# Patient Record
Sex: MALE | Race: WHITE
[De-identification: names, ages, dates, MRNs, and addresses within clinical notes are randomized per-mention and may not be internally consistent; named-entity substitution may affect disease eponyms.]

---

## 2017-01-02 NOTE — DX
PA and Lateral Chest 

 

Clinical Indications:  Chest tightness x2 days, COPD

 

Comparison: August 10, 2015, March 11, 2015, May 17, 2014

 

Findings: There is patchy density in the right upper lobe that may represent underlying pneumonia. Th
ere is no pleural fluid. There is chronic or recurrent bronchial wall thickening associated with mild
ly prominent lung volumes. Size and pulmonary vascularity are stable and normal. Paraspinal stripe co
nvexity in the low thoracic region is stable since 2014.

 

Impression:  Airways disease. Subtle right upper lobe infiltrate. If the patient is to receive antibi
otic therapy, then recommend followup chest x-ray to insure clearing. If antibiotics are not indicate
d, then consider noncontrast chest CT for further evaluation.

## 2017-01-02 NOTE — EDPHY
H & P


Stated Complaint: Increased pain to fracture, numb hand, coughing blood, lower 

back pain.


Time Seen by Provider: 01/02/17 09:14


HPI/ROS: 


CHIEF COMPLAINT:  Right hand swelling and numbness, left mid back pain





HISTORY OF PRESENT ILLNESS:  This is a 56-year-old male with history of alcohol 

abuse who reportedly fell 2 days ago (December 31st) while intoxicated.  He was 

evaluated at that time and found to have a right proximal humerus fracture.  

His evaluation included a CT scan of his head and neck, right shoulder and 

right humerus x-rays.  He was placed in a sling and referred to Orthopedic 

surgery for follow-up.  He was discharged to the [facility].





He returns today complaining of right hand swelling and numbness.  He is 

wearing a sling with his hand in a dependent position.  He also reports left 

posterior lateral back/rib pain. He tells me that he has not had any alcohol 

since this accident occurred.  He reports being sober for several months this 

past summer and fall but fell off the [facility] last month.  He does not plan to 

drink again.  He has also quit smoking.





He states that he has a chronic cough that is actually somewhat improved 

slightly.  However he did have a recent nose bleed and reports coughing up some 

blood.  He has some chest pain that he believes is related to his ribcage pain. 

He does not feel short of breath.





He was intoxicated at the homeless shelter when this fall occurred and he is 

not welcome back there as a result.  He has been sleeping on the floor in 

various places for the last 2 nights.  He has an apartment that is being 

provided by a [employer] and will be available on January 10th, 1 

week from now.





He is asking about a respite bed.





REVIEW OF SYSTEMS:





A ten point review of systems was performed and is negative with the exception 

of the items mentioned in the HPI.





Source: Patient


Exam Limitations: No limitations





- Personal History


Tetanus Vaccine Date: 2008





- Medical/Surgical History


Hx Asthma: No


Hx Chronic Respiratory Disease: Yes


Hx Diabetes: No


Hx Cardiac Disease: No


Hx Renal Disease: No


Hx Cirrhosis: No


Hx Alcoholism: No


Hx HIV/AIDS: No


Hx Splenectomy or Spleen Trauma: No


Other PMH: COPD.  Alcohol abuse.  Tobacco abuse.  Hepatitis-C.  Episode of 

pneumonia





- Social History


Smoking Status: Light smoker


Alcohol Use: Sober (He states that he has been sober birth the past 2 days.)


Additional Social History: 


He is homeless.





- Physical Exam


Exam: 


General Appearance:  Alert.  Vital signs reviewed.  Blood pressure 152/96.


Eyes:  Pupils equal and round, mild bilateral conjunctival injection, no 

discharge. Anicteric.


ENT, Mouth:  Mucous membranes are moist, no oropharyngeal erythema or edema.


Neck:  Nontender to palpation over the cervical spine in the posterior midline.

  Trachea midline.


Respiratory:  Lungs are clear to auscultation but breath sounds are somewhat 

distant; no wheezes, rales, or rhonchi.


Cardiovascular:  Regular rate and rhythm; no murmur, rub, or gallop. Not 

tachycardic at the time of my exam.


Gastrointestinal:  Abdomen is obese, soft and nontender, no masses or 

organomegaly, bowel sounds normal.


Skin:  Warm and dry, no rashes on exposed skin, normal color.


Back:  Nontender to palpation over the thoracolumbar spine.


Thorax:  Point tenderness over the lower ribs on the left posterior laterally, 

no crepitus.


Extremities:  His right arm is in a sling with the right hand dependent and 

swollen.  He is able to flex and extend his fingers.


Pulses:  2+ radial pulses bilaterally.


Neurological:  Alert and oriented.  Moving about easily.


Psychiatric:  Normal affect.


Constitutional: 


 Initial Vital Signs











Temperature (C)  36.6 C   01/02/17 08:29


 


Heart Rate  103 H  01/02/17 08:29


 


Respiratory Rate  20   01/02/17 08:29


 


Blood Pressure  152/96 H  01/02/17 08:29


 


O2 Sat (%)  97   01/02/17 08:29








 











O2 Delivery Mode               Room Air














Allergies/Adverse Reactions: 


 





No Known Allergies Allergy (Verified 12/31/16 19:32)


 








Home Medications: 














 Medication  Instructions  Recorded


 


Albuterol  05/17/14


 


Ibuprofen [Motrin (*)] 800 mg PO Q6 #15 tab 09/14/15


 


Hydrocodone/APAP 5/325 [Norco 1 each PO Q4-6PRN PRN #11 tab 12/31/16





5/325 (*)]  














Medical Decision Making


ED Course/Re-evaluation: 


Chest x-ray ordered to rule out rib injury or pneumothorax.  He does have some 

rib tenderness in the left lower ribcage posterior laterally.  Given his recent 

trauma he certainly could have a rib injury that would be causing this pain and 

also be causing some difficulty with breathing.  He is not hypoxic.  He is not 

febrile.





No rib injury pneumothorax seen.





Think that is hand swelling and sensation of numbness is due to the fact that 

his hand has been in a dependent position in the sling.  A shoulder immobilizer 

was tried but did not improve the position of his hand.  He was placed back in 

the sling and the sling was adjusted.  He is not having worsening humerus pain 

and I have not reimaged his fracture as a result.  He has normal sensation over 

his injured arm and hand.  He has 5/5  strength in the right hand.  Right 

radial pulses 2+.  I do not suspect neurovascular injury.





I do not find evidence of other injuries related to this recent trauma.





 


Case management was consulted concerning disposition.





Departure





- Departure


Disposition: Home, Routine, Self-Care


Clinical Impression: 


Arm fracture, right


Qualifiers:


 Encounter type: subsequent encounter Fracture healing: with routine healing 

Qualifier Code: (S42.301D) Unspecified fracture of shaft of humerus, right arm, 

subsequent encounter for fracture with routine healing


Condition: Good


Instructions:  Arm Fracture in Adults (ED)


Additional Instructions: 


You must wear the sling until you are seen by a specialist.  You were referred 

to Dr. Payan, orthopedic surgeon.  Please call his office tomorrow and 

arrange a follow-up appointment.  Tell the office staff that you have a 

fracture of your upper arm and have been placed in a sling.  If you have 

difficulty making this appointment you can call the emergency department and 

ask to speak with the .





When you are wearing the sling you should have your right hand elevated, it 

should not hanging down.  When your right hand is hanging down it will become 

swollen and might get numb and hurt.





I know you talked about going back to the homeless shelter with our case 

manager and were not interested in that process.  As you know, you can go to 

any of the warming shelters.


Referrals: 


[facility] [Outside] - As per Instructions


Franco Payan MD [Medical Doctor] - As per Instructions

## 2017-03-02 ENCOUNTER — HOSPITAL ENCOUNTER (OUTPATIENT)
Dept: HOSPITAL 80 - FIMAGING | Age: 57
End: 2017-03-02
Payer: MEDICAID

## 2017-03-02 DIAGNOSIS — S42.211D: Primary | ICD-10-CM

## 2017-04-11 ENCOUNTER — HOSPITAL ENCOUNTER (EMERGENCY)
Dept: HOSPITAL 80 - FED | Age: 57
Discharge: HOME | End: 2017-04-11
Payer: MEDICAID

## 2017-04-11 VITALS — RESPIRATION RATE: 18 BRPM | OXYGEN SATURATION: 95 %

## 2017-04-11 VITALS — SYSTOLIC BLOOD PRESSURE: 165 MMHG | HEART RATE: 82 BPM | TEMPERATURE: 97.9 F | DIASTOLIC BLOOD PRESSURE: 97 MMHG

## 2017-04-11 DIAGNOSIS — F17.200: ICD-10-CM

## 2017-04-11 DIAGNOSIS — J18.9: ICD-10-CM

## 2017-04-11 DIAGNOSIS — R10.84: Primary | ICD-10-CM

## 2017-04-11 LAB
% IMMATURE GRANULYOCYTES: 0.8 % (ref 0–1.1)
ABSOLUTE IMMATURE GRANULOCYTES: 0.09 10^3/UL (ref 0–0.1)
ABSOLUTE NRBC COUNT: 0 10^3/UL (ref 0–0.01)
ADD DIFF?: NO
ADD MORPH?: NO
ADD SCAN?: NO
ALBUMIN SERPL-MCNC: 4.9 G/DL (ref 3.5–5)
ALP SERPL-CCNC: 100 IU/L (ref 38–126)
ALT SERPL-CCNC: 82 IU/L (ref 21–72)
ANION GAP SERPL CALC-SCNC: 15 MEQ/L (ref 8–16)
AST SERPL-CCNC: 65 IU/L (ref 17–59)
ATYPICAL LYMPHOCYTE FLAG: 10 (ref 0–99)
BILIRUB SERPL-MCNC: 1.5 MG/DL (ref 0.1–1.4)
BILIRUBIN-CONJUGATED: 0.6 MG/DL (ref 0–0.5)
BILIRUBIN-UNCONJUGATED: 0.9 MG/DL (ref 0–1.1)
CALCIUM SERPL-MCNC: 9.8 MG/DL (ref 8.5–10.4)
CHLORIDE SERPL-SCNC: 104 MEQ/L (ref 97–110)
CO2 SERPL-SCNC: 22 MEQ/L (ref 22–31)
CREAT SERPL-MCNC: 0.9 MG/DL (ref 0.7–1.3)
ERYTHROCYTE [DISTWIDTH] IN BLOOD BY AUTOMATED COUNT: 12.3 % (ref 11.5–15.2)
FRAGMENT RBC FLAG: 0 (ref 0–99)
GFR SERPL CREATININE-BSD FRML MDRD: > 60 ML/MIN/{1.73_M2}
GLUCOSE SERPL-MCNC: 133 MG/DL (ref 70–100)
HCT VFR BLD CALC: 45.4 % (ref 40–51)
HGB BLD-MCNC: 15.5 G/DL (ref 13.7–17.5)
LEFT SHIFT FLG: 10 (ref 0–99)
LIPEMIA HEMOLYSIS FLAG: 90 (ref 0–99)
MCH RBC BLDCO QN: 31.9 PG (ref 27.9–34.1)
MCHC RBC AUTO-ENTMCNC: 34.1 G/DL (ref 32.4–36.7)
MCV RBC AUTO: 93.4 FL (ref 81.5–99.8)
NRBC-AUTO%: 0 % (ref 0–0.2)
PLATELET # BLD: 212 10^3/UL (ref 150–400)
PLATELET CLUMPS FLAG: 10 (ref 0–99)
PMV BLD AUTO: 11.5 FL (ref 8.7–11.7)
POTASSIUM SERPL-SCNC: 4.1 MEQ/L (ref 3.5–5.2)
PROT SERPL-MCNC: 8.5 G/DL (ref 6.3–8.2)
RBC # BLD AUTO: 4.86 10^6/UL (ref 4.4–6.38)
SODIUM SERPL-SCNC: 141 MEQ/L (ref 134–144)

## 2017-04-11 NOTE — CPEKG
Heart Rate: 97

RR Interval: 619

P-R Interval: 140

QRSD Interval: 92

QT Interval: 360

QTC Interval: 458

P Axis: 59

QRS Axis: 4

T Wave Axis: 39

EKG Severity - NORMAL ECG -

EKG Impression: SINUS RHYTHM

Electronically Signed By: Judith Conti 11-Apr-2017 13:41:51

## 2017-04-11 NOTE — EDPHY
H & P


Time Seen by Provider: 04/11/17 09:52


HPI/ROS: 





CHIEF COMPLAINT: Abdominal pain. 





HISTORY OF PRESENT ILLNESS: The patient is a 57-year-old male with a history of 

COPD and alcoholism who presents with 2 days of generalized abdominal pain.  

The pain waxes and wanes and is rated 8/10.  Associated with nausea last 

evening.  He developed persistent shortness of breath and chest pressure last 

night, partially relieved albuterol inhalers.  He also has an ongoing dry 

cough.  He reports that this feels similar to when he previously had food 

poisoning. He has not eaten in 2 days but is able to keep small amounts of 

water down. He denies fever, vomiting, diarrhea, or other complaints. 





REVIEW OF SYSTEMS:


A complete 10-point review of systems was performed and is negative except for 

those items mentioned in the HPI.








Past Medical/Surgical History: 





Hepatitis C, pneumonia, COPD, osteoarthritis, peptic ulcer disease. 


Social History: 





Alcohol abuse, smoker. 


Smoking Status: Light smoker


Physical Exam: 





General Appearance: Alert, appears in pain 


Eyes: Pupils equal and round, no conjunctival pallor or injection


ENT, Mouth: Mucous membranes moist


Neck: Normal inspection


Respiratory: Scattered expiratory wheezes. 


Cardiovascular: Regular rate and rhythm 


Gastrointestinal:  Abdomen is distended, diffusely tender with rebound, bowel 

sounds normal. 


Neurological:  A&O, nonfocal exam


Skin:  Warm and dry, no rash


Extremities:  Nontender, no pedal edema


Psychiatric: Mood and affect normal





Constitutional: 


 Initial Vital Signs











Temperature (C)  36.7 C   04/11/17 09:40


 


Heart Rate  98   04/11/17 09:40


 


Respiratory Rate  18   04/11/17 09:40


 


Blood Pressure  163/124 H  04/11/17 09:40


 


O2 Sat (%)  96   04/11/17 09:40








 











O2 Delivery Mode               Room Air














Allergies/Adverse Reactions: 


 





No Known Allergies Allergy (Verified 04/11/17 09:38)


 








Home Medications: 














 Medication  Instructions  Recorded


 


Albuterol  05/17/14


 


Azithromycin [Zithromax] 250 mg PO DAILY #6 tab 04/11/17


 


Hydrocodone/APAP 5/325 [Norco 1 - 2 tab PO Q4H PRN #10 tab 04/11/17





5/325]  


 


Ondansetron Odt [Zofran Odt 4 mg 4 mg PO Q4 PRN #6 tab 04/11/17





(*)]  


 


Pantoprazole Sodium [Protonix 40mg 40 mg PO DAILY #20 tab 04/11/17





(*)]  














Medical Decision Making





- Diagnostics


EKG Interpretation: 





EKG interpreted by me reveals normal sinus rhythm, rate 97, no ST or T segment 

changes.





Imaging: 


CT scan of the abdomen and pelvis read by the radiologist is unremarkable.


Chest x-ray independently reviewed by me pneumonitis versus atypical pneumonia.





ED Course/Re-evaluation: 





57-year-old male with a history of COPD and past alcoholism presents with 8/10 

generalized abdominal pain and peritoneal signs.  Will obtain a stat abdominal 

CT. An IV was established and labs ordered. 4mg IV Morphine and 4mg IV Zofran 

administered for pain and nausea. Chest pain/SOB c/w bronchospasm; doubt ACS.  

DuoNeb treatment administered. 





WBC 11.17. Lipase is normal at 123. AST/ALT slightly elevated. 





1129: Reassessed patient. Discussed results of CT scan. He is feeling better 

but is still has abd pain. 15mg IV Toradol administered. Chest x-ray ordered. 





I independently reviewed the patient's chest x-ray on the PACS system. Please 

see Imaging section for radiologist report. 





1234: Reassessed patient. He is feeling better after DuoNeb. The chest 

tightness and cough have resolved. Lungs are CTA.  He is feeling much better. 

The abdominal pain has lessened and he is tender only in the epigastrium. He 

tells me now that he has a history of peptic ulcer disease and this feels 

similar. I will prescribe Protonix for this abdominal pain and Azithromycin for 

pneumonitis seen on chest x-ray. He is comfortable with this plan.  I feel he 

is safe for discharge at this time. He understands to call his doctor today to 

set up an appointment for tomorrow.   Abdominal exam is benign on discharge; 

localized epigastric tenderness without peritoneal signs. Abdominal pain 

precautions given.





Differential Diagnosis: 





The differential diagnosis for the patient's abdominal pain included but was 

not limited to appendicitis, cholecystitis, hernias, testicular torsion, 

gastritis, and urinary tract infection.





- Data Points


Laboratory Results: 


 Laboratory Results





 04/11/17 09:53 





 04/11/17 09:53 








Medications Given: 


 








Discontinued Medications





Albuterol/Ipratropium (Duoneb)  3 ml IH EDNOW ONE


   Stop: 04/11/17 10:12


   Last Admin: 04/11/17 10:42 Dose:  3 ml


Sodium Chloride (Ns)  1,000 mls @ 0 mls/hr IV ONCE ONE


   PRN Reason: Wide Open


   Stop: 04/11/17 09:59


   Last Admin: 04/11/17 10:00 Dose:  1,000 mls


Ketorolac Tromethamine (Toradol)  15 mg IVP EDNOW ONE


   Stop: 04/11/17 11:30


   Last Admin: 04/11/17 12:15 Dose:  15 mg


Morphine Sulfate (Morphine)  4 mg IVP Q1H PRN


   PRN Reason: Pain, Severe Unable to Take PO


   Stop: 04/11/17 12:12


   Last Admin: 04/11/17 10:42 Dose:  4 mg


Ondansetron HCl (Zofran)  4 mg IVP EDNOW ONE


   Stop: 04/11/17 10:12


   Last Admin: 04/11/17 10:43 Dose:  4 mg


Pantoprazole Sodium (Protonix)  40 mg PO EDNOW ONE


   Stop: 04/11/17 12:35


   Last Admin: 04/11/17 13:00 Dose:  40 mg








Departure





- Departure


Disposition: Home, Routine, Self-Care


Clinical Impression: 


 Pneumonitis





Abdominal pain


Qualifiers:


 Abdominal location: generalized Qualified Code(s): R10.84 - Generalized 

abdominal pain





Condition: Good


Instructions:  Pneumonitis (ED), Abdominal Pain (ED)


Additional Instructions: 


Take Azithromycin and Protonix as prescribed. 





Follow up with your primary care provider tomorrow as discussed. 





Return to the emergency department if you experience any serious worsening of 

condition. 


Referrals: 


[facility],. [Clinic] - As per Instructions


Prescriptions: 


Azithromycin [Zithromax] 250 mg PO DAILY #6 tab


Hydrocodone/APAP 5/325 [Norco 5/325] 1 - 2 tab PO Q4H PRN #10 tab


 PRN Reason: Pain, Moderate


Ondansetron Odt [Zofran Odt 4 mg (*)] 4 mg PO Q4 PRN #6 tab


 PRN Reason: Nausea


Pantoprazole Sodium [Protonix 40mg (*)] 40 mg PO DAILY #20 tab


Report Scribed for: Judith Conti


Report Scribed by: Rian Rascon


Date of Report: 04/11/17


Time of Report: 09:53


Physician Review and Approval Statement: 





04/11/17 09:53


Portions of this note were transcribed by a medical scribe.  I personally 

performed a history, physical exam, medical decision making, and confirmed 

accuracy of information the transcribed note.

## 2017-05-09 ENCOUNTER — HOSPITAL ENCOUNTER (EMERGENCY)
Dept: HOSPITAL 80 - FED | Age: 57
Discharge: LEFT BEFORE DISCHARGE | End: 2017-05-09
Payer: MEDICAID

## 2017-05-09 VITALS
OXYGEN SATURATION: 97 % | DIASTOLIC BLOOD PRESSURE: 93 MMHG | SYSTOLIC BLOOD PRESSURE: 146 MMHG | TEMPERATURE: 98.1 F | HEART RATE: 84 BPM | RESPIRATION RATE: 18 BRPM

## 2017-05-09 DIAGNOSIS — Z53.21: Primary | ICD-10-CM

## 2017-05-22 ENCOUNTER — HOSPITAL ENCOUNTER (OUTPATIENT)
Dept: HOSPITAL 80 - FIMAGING | Age: 57
End: 2017-05-22
Attending: FAMILY MEDICINE
Payer: MEDICAID

## 2017-05-22 DIAGNOSIS — S42.211A: Primary | ICD-10-CM

## 2017-11-07 ENCOUNTER — HOSPITAL ENCOUNTER (INPATIENT)
Dept: HOSPITAL 80 - FED | Age: 57
LOS: 2 days | Discharge: HOME | DRG: 193 | End: 2017-11-09
Attending: INTERNAL MEDICINE | Admitting: HOSPITALIST
Payer: MEDICAID

## 2017-11-07 DIAGNOSIS — I10: ICD-10-CM

## 2017-11-07 DIAGNOSIS — B18.2: ICD-10-CM

## 2017-11-07 DIAGNOSIS — J44.1: ICD-10-CM

## 2017-11-07 DIAGNOSIS — J96.01: ICD-10-CM

## 2017-11-07 DIAGNOSIS — E87.2: ICD-10-CM

## 2017-11-07 DIAGNOSIS — J10.00: Primary | ICD-10-CM

## 2017-11-07 LAB
HIV TYPE 1 AND 2: NEGATIVE
PLATELET # BLD: 93 10^3/UL (ref 150–400)

## 2017-11-07 RX ADMIN — FLUTICASONE PROPIONATE SCH SPRAYS: 50 SPRAY, METERED NASAL at 22:05

## 2017-11-07 RX ADMIN — IPRATROPIUM BROMIDE AND ALBUTEROL SULFATE SCH ML: .5; 3 SOLUTION RESPIRATORY (INHALATION) at 21:27

## 2017-11-07 RX ADMIN — OSELTAMIVIR PHOSPHATE SCH MG: 75 CAPSULE ORAL at 17:18

## 2017-11-07 RX ADMIN — ONDANSETRON PRN MG: 2 SOLUTION INTRAMUSCULAR; INTRAVENOUS at 17:29

## 2017-11-07 RX ADMIN — ONDANSETRON PRN MG: 2 SOLUTION INTRAMUSCULAR; INTRAVENOUS at 22:05

## 2017-11-07 RX ADMIN — OSELTAMIVIR PHOSPHATE SCH MG: 75 CAPSULE ORAL at 22:06

## 2017-11-07 RX ADMIN — IPRATROPIUM BROMIDE AND ALBUTEROL SULFATE SCH ML: .5; 3 SOLUTION RESPIRATORY (INHALATION) at 16:50

## 2017-11-07 RX ADMIN — OSELTAMIVIR PHOSPHATE SCH: 75 CAPSULE ORAL at 18:10

## 2017-11-07 RX ADMIN — PANTOPRAZOLE SODIUM SCH MG: 40 TABLET, DELAYED RELEASE ORAL at 19:08

## 2017-11-07 RX ADMIN — GUAIFENESIN AND CODEINE PHOSPHATE PRN ML: 10; 100 LIQUID ORAL at 17:18

## 2017-11-07 RX ADMIN — NICOTINE SCH MG: 21 PATCH, EXTENDED RELEASE TOPICAL at 17:18

## 2017-11-07 RX ADMIN — GUAIFENESIN SCH MG: 600 TABLET, EXTENDED RELEASE ORAL at 22:05

## 2017-11-07 RX ADMIN — SODIUM CHLORIDE SCH MLS: 900 INJECTION, SOLUTION INTRAVENOUS at 17:19

## 2017-11-07 NOTE — EDPHY
H & P


Smoking Status: Current every day smoker


Time Seen by Provider: 11/07/17 11:24


HPI/ROS: 





CHIEF COMPLAINT:  Cough, shortness of breath





HISTORY OF PRESENT ILLNESS:  57-year-old male presents to the emergency 

department with productive cough and fever since yesterday.  Patient is a 

chronic smoker.  He has had productive cough over last few days which became 

acutely worse yesterday.  He does not receive flu shot.  He did receive the 

pneumonia vaccine 6 months ago.  He denies abdominal pain.  Denies pain in his 

chest.  He feels short of breath especially when he is coughing.  No vomiting 

or diarrhea.  No known ill contacts.  No recent travel.  No rash.  No headache.





REVIEW OF SYSTEMS:


Constitutional:  Subjective fevers, chills


Eyes:  No double or blurry vision.


ENT:  Sore throat


Respiratory:  Cough, shortness of breath as above.


Cardiac:  No chest pain.


Gastrointestinal:  No abdominal pain, vomiting or diarrhea.


Genitourinary:  No dysuria.


Musculoskeletal:  No neck or back pain.


Skin:  No rashes.


Neurological:  No headache. (Dimple Bui)


Past Medical/Surgical History: 





Alcoholism, hepatitis-C, COPD, history of pneumonia (Ramya,Dimple M)


Social History: 





Single and lives in [place] (Ramya,Dimple M)


Physical Exam: 





General Appearance:  Alert, no distress.  Initial temperature 38.0degrees, 92% 

on room air


Eyes:  Pupils equal and round.  Extraocular motions are all intact.


ENT:  Mouth:  Mucous membranes moist.


Respiratory:  Diffuse expiratory wheezing throughout specially in the left 

base.  Rales in the left base.  No respiratory distress.


Cardiovascular:  Regular rate and rhythm.


Gastrointestinal:  Abdomen is soft and nontender, no masses, no rebound or 

guarding, bowel sounds normal.


Neurological:  Alert and oriented x 3, cranial nerves II through XII grossly 

intact


Skin:  Warm and dry, no rashes.


Musculoskeletal:  Nontender to palpate along the cervical, thoracic or lumbar 

spine.  Neck is supple.


Extremities:  Full range of motion and no peripheral edema.


Psychiatric:  Patient is oriented X 3, there is no agitation. (Dimple Bui M)


Constitutional: 


 Initial Vital Signs











Temperature (C)  38 C   11/07/17 10:37


 


Heart Rate  90   11/07/17 10:37


 


Respiratory Rate  19   11/07/17 10:37


 


Blood Pressure  140/66 H  11/07/17 10:37


 


O2 Sat (%)  92   11/07/17 10:37








 











O2 Delivery Mode               Room Air














Allergies/Adverse Reactions: 


 





No Known Allergies Allergy (Verified 11/07/17 10:35)


 








Home Medications: 














 Medication  Instructions  Recorded


 


Albuterol [Proventil Inhaler HFA 1 - 2 puffs IH DAILY PRN 11/07/17





(*)]  


 


Esomeprazole Magnesium [Nexium 20 mg PO DAILY 11/07/17





24Hr]  


 


Fluticasone/Salmeter 250/50Mcg 1 puffs IH BID 11/07/17





[Advair 250/50 (*)]  


 


Naproxen 500 mg PO BID PRN 11/07/17


 


Trimix Injection 1 each IJ DAILY PRN 11/07/17














Medical Decision Making





- Diagnostics


Imaging: Discussed imaging studies w/ On call Radiologist


ED Course/Re-evaluation: 





57-year-old male presents with productive cough and fever.





Influenza a was positive.  Rapid strep was negative.  [place] 

[place] [address]. His chemistries reveal normal sodium and potassium.  CO2 was 

slightly low at 20. Patient received IV normal saline.





Initial venous lactate was 2.9.  I spoke with the patient about admission to 

the hospital, however the patient declined.





Chest x-ray reveals likely left lower lobe pneumonia.





Patient was kept on O2 saturation which remained in the upper 80s to low 90s on 

room air.  I recommended admission to the hospital and the patient now agrees 

with admission to the hospital.  He will be started on IV Levaquin 750 mg. He 

was also given 75 mg of Tamiflu p. o. since he feels that his symptoms became 

acutely worse yesterday.





Patient will be admitted to the hospitalist, Dr. Rhoades.





Based on the patient's laboratory studies and venous lactic, the patient meets 

criteria for severe sepsis.  I do not see any evidence for severe septic shock.

  Blood pressure is normal.  He is not hypotensive.





The case was discussed with Dr. Ellis Quinteros, secondary supervising physician, who 

also evaluated the patient. (Dimple Bui)


Differential Diagnosis: 





Shortness of breath including but not limited to pulmonary infectious process, 

COPD, asthma, pulmonary embolus and congestive heart failure. (Dimple Bui)


Other Provider: 





PHYSICIAN DOCUMENTATION:


The patient was evaluated and managed by the Physician Assistant and myself.  I 

have reviewed the chart and agree with the findings and plan of care as 

documented.  In addition, I examined the patient myself at 1420.  History 

confirmed as cough and fever since yesterday. Physical findings as follows:  

Bilateral rhonchi and wheezing.





Pneumonia and flu positive, lactate elevated.  IV fluids and 2nd lactate, does 

not have evidence of septic shock at this time in the ED.


Looks moderately ill, plan for admission at least overnight for the IV 

antibiotics and supportive care.





I am the secondary supervising physician. (Ellis Quinteros)





- Data Points


Laboratory Results: 


 Laboratory Results





 11/07/17 11:44 





 11/07/17 11:44 








Medications Given: 


 





Acetaminophen (Tylenol)  500 - 1,000 mg PO Q6HRS PRN


   PRN Reason: Pain, Mild/Fever, Can Take PO


   Stop: 05/06/18 14:13


   Last Admin: 11/08/17 07:27 Dose:  1,000 mg


Albuterol/Ipratropium (Duoneb)  3 ml IH QID [facility]


   Stop: 05/06/18 15:59


   Last Admin: 11/08/17 05:46 Dose:  Not Given


Benzonatate (Tessalon Pearles)  200 mg PO TID PRN


   PRN Reason: Cough, Mild


   Stop: 05/06/18 15:04


   Last Admin: 11/08/17 07:27 Dose:  200 mg


Fluticasone Propionate (Flonase Nasal Spray)  2 sprays EACHNARE DAILY [facility]


   Stop: 05/06/18 21:44


   Last Admin: 11/08/17 07:28 Dose:  2 sprays


Guaifenesin (Mucinex)  1,200 mg PO BID [facility]


   Stop: 05/06/18 20:59


   Last Admin: 11/08/17 07:27 Dose:  1,200 mg


Guaifenesin/Codeine Phosphate (Robitussin Ac)  10 ml PO Q6HRS PRN


   PRN Reason: Cough, Moderate


   Stop: 05/06/18 15:04


   Last Admin: 11/08/17 07:26 Dose:  10 ml


Sodium Chloride (Ns)  1,000 mls @ 150 mls/hr IV CONT [facility]


   Stop: 05/06/18 14:14


   Last Admin: 11/08/17 01:15 Dose:  1,000 mls


Nicotine (Nicoderm Cq)  21 mg TD DAILY [facility]


   Stop: 05/06/18 14:14


   Last Admin: 11/08/17 07:28 Dose:  21 mg


Nicotine Polacrilex (Nicorette)  2 mg B Q1HR PRN


   PRN Reason: Nicotine Withdrawal


   Stop: 05/06/18 14:13


   Last Admin: 11/08/17 07:27 Dose:  2 mg


Ondansetron HCl (Zofran)  4 mg IVP Q4HRS PRN


   PRN Reason: Nausea/Vomiting, Can't Take PO


   Stop: 05/06/18 14:13


   Last Admin: 11/08/17 02:59 Dose:  4 mg


Oseltamivir Phosphate (Tamiflu)  75 mg PO BIDMEAL [facility]


   Stop: 11/12/17 08:01


   Last Admin: 11/08/17 07:27 Dose:  75 mg


Pantoprazole Sodium (Protonix)  40 mg PO DAILY [facility]


   Stop: 05/07/18 08:59


   Last Admin: 11/08/17 07:27 Dose:  40 mg


Prednisone (Prednisone)  60 mg PO DAILY [facility]


   Stop: 05/06/18 14:44


   Last Admin: 11/08/17 07:26 Dose:  60 mg





Discontinued Medications





Acetaminophen (Tylenol)  1,000 mg PO EDNOW ONE


   Stop: 11/07/17 13:39


   Last Admin: 11/07/17 13:39 Dose:  1,000 mg


Albuterol (Proventil Neb)  3 ml NEB EDNOW ONE


   Stop: 11/07/17 14:27


   Last Admin: 11/07/17 14:45 Dose:  3 ml


Albuterol/Ipratropium (Duoneb)  3 ml IH EDNOW ONE


   Stop: 11/07/17 10:57


   Last Admin: 11/07/17 11:11 Dose:  3 ml


Sodium Chloride (Ns)  1,000 mls @ 0 mls/hr IV EDNOW ONE


   PRN Reason: Wide Open


   Stop: 11/07/17 14:03


   Last Admin: 11/07/17 14:03 Dose:  1,000 mls


Levofloxacin/Dextrose (Levaquin 750 Mg (Premix))  150 mls @ 100 mls/hr IV EDNOW 

ONE


   PRN Reason: Protocol


   Stop: 11/07/17 15:36


   Last Admin: 11/07/17 14:45 Dose:  150 mls


Sodium Chloride (Ns)  2,600 mls @ 5,200 mls/hr 30 ml/kg infuse over 30 min (

2600 ml) IV EDNOW ONE


   PRN Reason: Protocol


   Stop: 11/07/17 14:36


   Last Admin: 11/07/17 14:57 Dose:  2,600 mls


Ondansetron HCl (Zofran)  4 mg IVP EDNOW ONE


   Stop: 11/07/17 14:03


   Last Admin: 11/07/17 14:04 Dose:  4 mg


Oseltamivir Phosphate (Tamiflu)  75 mg PO EDNOW ONE


   Stop: 11/07/17 14:13


   Last Admin: 11/07/17 14:45 Dose:  75 mg








Departure





- Departure


Disposition: [facility]


Clinical Impression: 


 Influenza A





Pneumonia


Qualifiers:


 Pneumonia type: due to unspecified organism Laterality: left Lung location: 

lower lobe of lung Qualified Code(s): J18.1 - Lobar pneumonia, unspecified 

organism





Condition: Good

## 2017-11-07 NOTE — PDGENHP
History and Physical





- Chief Complaint


Acute cough





- History of Present Illness


Primary care provider:  Cinthya Galicia





HPI:  57-year-old male presenting with cough characterized as productive, with 

associated fever, general malaise, nausea.  Patient reports onset of symptoms 

on the day prior, and duration has been persistent thereafter.  His fever has 

been somewhat alleviated by naproxen at home.  He reports that his symptoms are 

very similar in character to his previous episode of pneumonia.  Patient has 

otherwise not been taking any recent antibiotics.





History Information





- Allergies/Home Medication List


Allergies/Adverse Reactions: 








No Known Allergies Allergy (Verified 11/07/17 10:35)


 





Home Medications: 








Albuterol [Proventil Inhaler HFA (*)] 1 - 2 puffs IH DAILY PRN 11/07/17 [Last 

Taken 3 Days Ago ~11/04/17]


Esomeprazole Magnesium [Nexium 24Hr] 20 mg PO DAILY 11/07/17 [Last Taken 11/07/ 17]


Fluticasone/Salmeter 250/50Mcg [Advair 250/50 (*)] 1 puffs IH BID 11/07/17 [

Last Taken 3 Days Ago ~11/04/17]


Naproxen 500 mg PO BID PRN 11/07/17 [Last Taken Unknown]


Trimix Injection 1 each IJ DAILY PRN 11/07/17 [Last Taken Unknown]





I have personally reviewed and updated: family history, medical history, social 

history, surgical history





- Past Medical History


COPD


Additional medical history: Hepatitis C virus without any treatment.  Low 

testosterone level.  Abdominal pain currently undergoing outpatient workup at 

[facility]





- Surgical History


Reports: appendectomy





- Family History


Additional family history: No family history of pulmonary issues or venous 

thromboembolism





- Social History


Smoking Status: Current every day smoker


Alcohol Use: Occasionally


Drug Use: None


Additional social history: Independent in his ADLs





Review of Systems


Review of Systems: 





ROS: 10pt was reviewed & negative except for what was stated in HPI & below


Constitutional: Reports: fever, malaise


Respiratory: Reports: cough





Physical Exam


Physical Exam: 

















Temp Pulse Resp BP Pulse Ox


 


 37.3 C   85   20   136/82 H  97 


 


 11/07/17 13:06  11/07/17 14:44  11/07/17 14:44  11/07/17 14:44  11/07/17 14:44




















O2 (L/minute)                  2














Constitutional: no apparent distress, not in pain, obese, uncomfortable


Eyes: PERRL, anicteric sclera, EOMI


Ears, Nose, Mouth, Throat: moist mucous membranes, hearing normal, ears appear 

normal, no oral mucosal ulcers


Cardiovascular: regular rate and rhythym, no murmur, rub, or gallop, No edema


Respiratory: expiratory wheeze, bronchial breath sounds, rhonchi (Mid posterior 

segment on the left side), No inspiratory crackles


Gastrointestinal: normoactive bowel sounds, soft, non-tender abdomen, no 

palpable masses, distension (Moderate)


Skin: other (Arthur face, vitiligo over the frontal scalp)


Neurologic: AAOx3, sensation intact bilaterally, No weakness


Psychiatric: interacting appropriately, not anxious, not encephalopathic, 

thought process linear





Lab Data & Imaging Review





 11/07/17 11:44





 11/07/17 11:44














WBC  15.05 10^3/uL (3.80-9.50)  H  11/07/17  11:44    


 


RBC  4.79 10^6/uL (4.40-6.38)   11/07/17  11:44    


 


Hgb  15.6 g/dL (13.7-17.5)   11/07/17  11:44    


 


Hct  43.6 % (40.0-51.0)   11/07/17  11:44    


 


MCV  91.0 fL (81.5-99.8)   11/07/17  11:44    


 


MCH  32.6 pg (27.9-34.1)   11/07/17  11:44    


 


MCHC  35.8 g/dL (32.4-36.7)   11/07/17  11:44    


 


RDW  12.8 % (11.5-15.2)   11/07/17  11:44    


 


Plt Count  93 10^3/uL (150-400)  L  11/07/17  11:44    


 


MPV  12.0 fL (8.7-11.7)  H  11/07/17  11:44    


 


Neut % (Auto)  83.6 % (39.3-74.2)  H  11/07/17  11:44    


 


Lymph % (Auto)  8.9 % (15.0-45.0)  L  11/07/17  11:44    


 


Mono % (Auto)  6.5 % (4.5-13.0)   11/07/17  11:44    


 


Eos % (Auto)  0.1 % (0.6-7.6)  L  11/07/17  11:44    


 


Baso % (Auto)  0.4 % (0.3-1.7)   11/07/17  11:44    


 


Nucleat RBC Rel Count  0.0 % (0.0-0.2)   11/07/17  11:44    


 


Absolute Neuts (auto)  12.59 10^3/uL (1.70-6.50)  H  11/07/17  11:44    


 


Absolute Lymphs (auto)  1.34 10^3/uL (1.00-3.00)   11/07/17  11:44    


 


Absolute Monos (auto)  0.98 10^3/uL (0.30-0.80)  H  11/07/17  11:44    


 


Absolute Eos (auto)  0.01 10^3/uL (0.03-0.40)  L  11/07/17  11:44    


 


Absolute Basos (auto)  0.06 10^3/uL (0.02-0.10)   11/07/17  11:44    


 


Absolute Nucleated RBC  0.00 10^3/uL (0-0.01)   11/07/17  11:44    


 


Immature Gran %  0.5 % (0.0-1.1)   11/07/17  11:44    


 


Immature Gran #  0.07 10^3/uL (0.00-0.10)   11/07/17  11:44    


 


VBG Lactic Acid  2.9 mmol/L (0.7-2.1)  H  11/07/17  11:44    


 


Sodium  141 mEq/L (134-144)   11/07/17  11:44    


 


Potassium  3.9 mEq/L (3.5-5.2)   11/07/17  11:44    


 


Chloride  102 mEq/L ()   11/07/17  11:44    


 


Carbon Dioxide  20 mEq/l (22-31)  L  11/07/17  11:44    


 


Anion Gap  19 mEq/L (8-16)  H  11/07/17  11:44    


 


BUN  9 mg/dL (7-23)   11/07/17  11:44    


 


Creatinine  1.0 mg/dL (0.7-1.3)   11/07/17  11:44    


 


Estimated GFR  > 60   11/07/17  11:44    


 


Glucose  108 mg/dL ()  H  11/07/17  11:44    


 


Calcium  9.0 mg/dL (8.5-10.4)   11/07/17  11:44    


 


Nasal Influenza A PCR  FLU A DETECTED  (NEGATIVE)   11/07/17  12:30    


 


Nasal Influenza B PCR  NEGATIVE FOR FLU B  (NEGATIVE)   11/07/17  12:30    


 


Group A Strep Screen  NEGATIVE  (NEGATIVE)   11/07/17  11:30    








Visualized and Interpreted Chest x-ray results: Yes


Chest X-Ray results: other (Possible patchy left-sided infiltrate)





Assessment & Plan


Assessment: 





57-year-old male presents with viral influenza a pneumonia complicated by acute 

COPD exacerbation





Plan: 





1.  Influenza a pneumonia.  Present on admission, acute, new problem this 

provider, further workup indicated, evidenced by patchy left lower lobe 

infiltrate with leukocytosis, fever, tachycardia, positive influenza a swab


-initiated on Tamiflu, continue 75 mg twice daily x5 days


-continue IV fluids


-discussed with Dimple Bui, emergency department provider, the patient has 

received 1 dose of IV levofloxacin, will hold on further antibiotics and check 

a procalcitonin level to see of his is substantially elevated which would 

correspond to a bacterial superinfection





2. Acute COPD exacerbation.  Evidenced by diffuse expiratory wheezes and 

bronchial breath sounds in the setting of an ongoing smoker, likely triggered 

by above


-initiate on steroids, prednisone 60 mg x5 days


-placed on scheduled DuoNeb treatment


-supportive care with Mucinex, Tessalon Perles, guaifenesin/codeine





3. Hepatitis-C virus.  Chronic, untreated, most likely contributing to 

thrombocytopenia with review of outside records including 3/11/2015 CT of the 

chest demonstrating some hepatic steatosis


-recommend outpatient management and follow-up at [facility]


-will get HIV test, verbal consent provided by patient





4. Testosterone deficiency.  Per patient report, his primary care provider has 

ordered hormonal studies, will perform given that the patient is missing his 

lab appointment today





5. Metabolic acidosis.  Acute, secondary to lactic acid, lactic 2.9, rechecking 

serum level at this time


-continue IV fluids, anticipate potentially delayed clearance in the setting of 

underlying liver disease





Diet.  Regular





Prophylaxis.  Low risk patient, SCDs





Code.  Full per patient, his ex-wife is MD POA





Disposition.  Anticipated discharge uncertain this time, anticipated length 

stay greater than 48 hours warranting inpatient admission status for acute 

influenza a pneumonia complicated by high risk acute COPD exacerbation.

## 2017-11-07 NOTE — PDGENHP
History and Physical





- Chief Complaint


Acute cough





- History of Present Illness


Primary care provider:  Cinthya Galicia





HPI:  57-year-old male presenting with cough characterized as productive, with 

associated fever, general malaise, nausea.  Patient reports onset of symptoms 

on the day prior, and duration has been persistent thereafter.  His fever has 

been somewhat alleviated by naproxen at home.  He reports that his symptoms are 

very similar in character to his previous episode of pneumonia.  Patient has 

otherwise not been taking any recent antibiotics.





History Information





- Allergies/Home Medication List


Allergies/Adverse Reactions: 








No Known Allergies Allergy (Verified 11/07/17 10:35)


 





Home Medications: 








Albuterol [Proventil Inhaler HFA (*)] 1 - 2 puffs IH DAILY PRN 11/07/17 [Last 

Taken 3 Days Ago ~11/04/17]


Esomeprazole Magnesium [Nexium 24Hr] 20 mg PO DAILY 11/07/17 [Last Taken 11/07/ 17]


Fluticasone/Salmeter 250/50Mcg [Advair 250/50 (*)] 1 puffs IH BID 11/07/17 [

Last Taken 3 Days Ago ~11/04/17]


Naproxen 500 mg PO BID PRN 11/07/17 [Last Taken Unknown]


Trimix Injection 1 each IJ DAILY PRN 11/07/17 [Last Taken Unknown]





I have personally reviewed and updated: family history, medical history, social 

history, surgical history





- Past Medical History


COPD


Additional medical history: Hepatitis C virus without any treatment.  Low 

testosterone level.  Abdominal pain currently undergoing outpatient workup at 

[facility]





- Surgical History


Reports: appendectomy





- Family History


Additional family history: No family history of pulmonary issues or venous 

thromboembolism





- Social History


Smoking Status: Current every day smoker


Alcohol Use: Occasionally


Drug Use: None


Additional social history: Independent in his ADLs





Review of Systems


Review of Systems: 





ROS: 10pt was reviewed & negative except for what was stated in HPI & below


Constitutional: Reports: fever, malaise


Respiratory: Reports: cough





Physical Exam


Physical Exam: 

















Temp Pulse Resp BP Pulse Ox


 


 37.3 C   85   20   136/82 H  97 


 


 11/07/17 13:06  11/07/17 14:44  11/07/17 14:44  11/07/17 14:44  11/07/17 14:44




















O2 (L/minute)                  2














Constitutional: no apparent distress, not in pain, obese, uncomfortable


Eyes: PERRL, anicteric sclera, EOMI


Ears, Nose, Mouth, Throat: moist mucous membranes, hearing normal, ears appear 

normal, no oral mucosal ulcers


Cardiovascular: regular rate and rhythym, no murmur, rub, or gallop, No edema


Respiratory: expiratory wheeze, bronchial breath sounds, rhonchi (Mid posterior 

segment on the left side), No inspiratory crackles


Gastrointestinal: normoactive bowel sounds, soft, non-tender abdomen, no 

palpable masses, distension (Moderate)


Skin: other (Arthur face, vitiligo over the frontal scalp)


Neurologic: AAOx3, sensation intact bilaterally, No weakness


Psychiatric: interacting appropriately, not anxious, not encephalopathic, 

thought process linear





Lab Data & Imaging Review





 11/07/17 11:44





 11/07/17 11:44














WBC  15.05 10^3/uL (3.80-9.50)  H  11/07/17  11:44    


 


RBC  4.79 10^6/uL (4.40-6.38)   11/07/17  11:44    


 


Hgb  15.6 g/dL (13.7-17.5)   11/07/17  11:44    


 


Hct  43.6 % (40.0-51.0)   11/07/17  11:44    


 


MCV  91.0 fL (81.5-99.8)   11/07/17  11:44    


 


MCH  32.6 pg (27.9-34.1)   11/07/17  11:44    


 


MCHC  35.8 g/dL (32.4-36.7)   11/07/17  11:44    


 


RDW  12.8 % (11.5-15.2)   11/07/17  11:44    


 


Plt Count  93 10^3/uL (150-400)  L  11/07/17  11:44    


 


MPV  12.0 fL (8.7-11.7)  H  11/07/17  11:44    


 


Neut % (Auto)  83.6 % (39.3-74.2)  H  11/07/17  11:44    


 


Lymph % (Auto)  8.9 % (15.0-45.0)  L  11/07/17  11:44    


 


Mono % (Auto)  6.5 % (4.5-13.0)   11/07/17  11:44    


 


Eos % (Auto)  0.1 % (0.6-7.6)  L  11/07/17  11:44    


 


Baso % (Auto)  0.4 % (0.3-1.7)   11/07/17  11:44    


 


Nucleat RBC Rel Count  0.0 % (0.0-0.2)   11/07/17  11:44    


 


Absolute Neuts (auto)  12.59 10^3/uL (1.70-6.50)  H  11/07/17  11:44    


 


Absolute Lymphs (auto)  1.34 10^3/uL (1.00-3.00)   11/07/17  11:44    


 


Absolute Monos (auto)  0.98 10^3/uL (0.30-0.80)  H  11/07/17  11:44    


 


Absolute Eos (auto)  0.01 10^3/uL (0.03-0.40)  L  11/07/17  11:44    


 


Absolute Basos (auto)  0.06 10^3/uL (0.02-0.10)   11/07/17  11:44    


 


Absolute Nucleated RBC  0.00 10^3/uL (0-0.01)   11/07/17  11:44    


 


Immature Gran %  0.5 % (0.0-1.1)   11/07/17  11:44    


 


Immature Gran #  0.07 10^3/uL (0.00-0.10)   11/07/17  11:44    


 


VBG Lactic Acid  2.9 mmol/L (0.7-2.1)  H  11/07/17  11:44    


 


Sodium  141 mEq/L (134-144)   11/07/17  11:44    


 


Potassium  3.9 mEq/L (3.5-5.2)   11/07/17  11:44    


 


Chloride  102 mEq/L ()   11/07/17  11:44    


 


Carbon Dioxide  20 mEq/l (22-31)  L  11/07/17  11:44    


 


Anion Gap  19 mEq/L (8-16)  H  11/07/17  11:44    


 


BUN  9 mg/dL (7-23)   11/07/17  11:44    


 


Creatinine  1.0 mg/dL (0.7-1.3)   11/07/17  11:44    


 


Estimated GFR  > 60   11/07/17  11:44    


 


Glucose  108 mg/dL ()  H  11/07/17  11:44    


 


Calcium  9.0 mg/dL (8.5-10.4)   11/07/17  11:44    


 


Nasal Influenza A PCR  FLU A DETECTED  (NEGATIVE)   11/07/17  12:30    


 


Nasal Influenza B PCR  NEGATIVE FOR FLU B  (NEGATIVE)   11/07/17  12:30    


 


Group A Strep Screen  NEGATIVE  (NEGATIVE)   11/07/17  11:30    








Visualized and Interpreted Chest x-ray results: Yes


Chest X-Ray results: other (Possible patchy left-sided infiltrate)





Assessment & Plan


Assessment: 





57-year-old male presents with viral influenza a pneumonia complicated by acute 

COPD exacerbation





Plan: 





1.  Influenza a pneumonia.  Present on admission, acute, new problem this 

provider, further workup indicated, evidenced by patchy left lower lobe 

infiltrate with leukocytosis, fever, tachycardia, positive influenza a swab


-initiated on Tamiflu, continue 75 mg twice daily x5 days


-continue IV fluids


-discussed with Dimple Bui, emergency department provider, the patient has 

received 1 dose of IV levofloxacin, will hold on further antibiotics and check 

a procalcitonin level to see of his is substantially elevated which would 

correspond to a bacterial superinfection





2. Acute COPD exacerbation.  Evidenced by diffuse expiratory wheezes and 

bronchial breath sounds in the setting of an ongoing smoker, likely triggered 

by above


-initiate on steroids, prednisone 60 mg x5 days


-placed on scheduled DuoNeb treatment


-supportive care with Mucinex, Tessalon Perles, guaifenesin/codeine





3. Hepatitis-C virus.  Chronic, untreated, most likely contributing to 

thrombocytopenia with review of outside records including 3/11/2015 CT of the 

chest demonstrating some hepatic steatosis


-recommend outpatient management and follow-up at [facility]


-will get HIV test, verbal consent provided by patient





4. Testosterone deficiency.  Per patient report, his primary care provider has 

ordered hormonal studies, will perform given that the patient is missing his 

lab appointment today





5. Metabolic acidosis.  Acute, secondary to lactic acid, lactic 2.9, rechecking 

serum level at this time


-continue IV fluids, anticipate potentially delayed clearance in the setting of 

underlying liver disease





Diet.  Regular





Prophylaxis.  Low risk patient, SCDs





Code.  Full per patient, his ex-wife is MD POA





Disposition.  Anticipated discharge uncertain this time, anticipated length 

stay greater than 48 hours warranting inpatient admission status for acute 

influenza a pneumonia complicated by high risk acute COPD exacerbation.

## 2017-11-07 NOTE — EDPHY
H & P


Smoking Status: Current every day smoker


Time Seen by Provider: 11/07/17 11:24


HPI/ROS: 





CHIEF COMPLAINT:  Cough, shortness of breath





HISTORY OF PRESENT ILLNESS:  57-year-old male presents to the emergency 

department with productive cough and fever since yesterday.  Patient is a 

chronic smoker.  He has had productive cough over last few days which became 

acutely worse yesterday.  He does not receive flu shot.  He did receive the 

pneumonia vaccine 6 months ago.  He denies abdominal pain.  Denies pain in his 

chest.  He feels short of breath especially when he is coughing.  No vomiting 

or diarrhea.  No known ill contacts.  No recent travel.  No rash.  No headache.





REVIEW OF SYSTEMS:


Constitutional:  Subjective fevers, chills


Eyes:  No double or blurry vision.


ENT:  Sore throat


Respiratory:  Cough, shortness of breath as above.


Cardiac:  No chest pain.


Gastrointestinal:  No abdominal pain, vomiting or diarrhea.


Genitourinary:  No dysuria.


Musculoskeletal:  No neck or back pain.


Skin:  No rashes.


Neurological:  No headache. (Dimple Bui)


Past Medical/Surgical History: 





Alcoholism, hepatitis-C, COPD, history of pneumonia (Ramya,Dimple M)


Social History: 





Single and lives in [place] (Ramya,Dimple M)


Physical Exam: 





General Appearance:  Alert, no distress.  Initial temperature 38.0degrees, 92% 

on room air


Eyes:  Pupils equal and round.  Extraocular motions are all intact.


ENT:  Mouth:  Mucous membranes moist.


Respiratory:  Diffuse expiratory wheezing throughout specially in the left 

base.  Rales in the left base.  No respiratory distress.


Cardiovascular:  Regular rate and rhythm.


Gastrointestinal:  Abdomen is soft and nontender, no masses, no rebound or 

guarding, bowel sounds normal.


Neurological:  Alert and oriented x 3, cranial nerves II through XII grossly 

intact


Skin:  Warm and dry, no rashes.


Musculoskeletal:  Nontender to palpate along the cervical, thoracic or lumbar 

spine.  Neck is supple.


Extremities:  Full range of motion and no peripheral edema.


Psychiatric:  Patient is oriented X 3, there is no agitation. (Dimple Bui M)


Constitutional: 


 Initial Vital Signs











Temperature (C)  38 C   11/07/17 10:37


 


Heart Rate  90   11/07/17 10:37


 


Respiratory Rate  19   11/07/17 10:37


 


Blood Pressure  140/66 H  11/07/17 10:37


 


O2 Sat (%)  92   11/07/17 10:37








 











O2 Delivery Mode               Room Air














Allergies/Adverse Reactions: 


 





No Known Allergies Allergy (Verified 11/07/17 10:35)


 








Home Medications: 














 Medication  Instructions  Recorded


 


Albuterol [Proventil Inhaler HFA 1 - 2 puffs IH DAILY PRN 11/07/17





(*)]  


 


Esomeprazole Magnesium [Nexium 20 mg PO DAILY 11/07/17





24Hr]  


 


Fluticasone/Salmeter 250/50Mcg 1 puffs IH BID 11/07/17





[Advair 250/50 (*)]  


 


Naproxen 500 mg PO BID PRN 11/07/17


 


Trimix Injection 1 each IJ DAILY PRN 11/07/17














Medical Decision Making





- Diagnostics


Imaging: Discussed imaging studies w/ On call Radiologist


ED Course/Re-evaluation: 





57-year-old male presents with productive cough and fever.





Influenza a was positive.  Rapid strep was negative.  [place] 

[place] [address]. His chemistries reveal normal sodium and potassium.  CO2 was 

slightly low at 20. Patient received IV normal saline.





Initial venous lactate was 2.9.  I spoke with the patient about admission to 

the hospital, however the patient declined.





Chest x-ray reveals likely left lower lobe pneumonia.





Patient was kept on O2 saturation which remained in the upper 80s to low 90s on 

room air.  I recommended admission to the hospital and the patient now agrees 

with admission to the hospital.  He will be started on IV Levaquin 750 mg. He 

was also given 75 mg of Tamiflu p. o. since he feels that his symptoms became 

acutely worse yesterday.





Patient will be admitted to the hospitalist, Dr. Rhoades.





Based on the patient's laboratory studies and venous lactic, the patient meets 

criteria for severe sepsis.  I do not see any evidence for severe septic shock.

  Blood pressure is normal.  He is not hypotensive.





The case was discussed with Dr. Ellis Quinteros, secondary supervising physician, who 

also evaluated the patient. (Dimple Bui)


Differential Diagnosis: 





Shortness of breath including but not limited to pulmonary infectious process, 

COPD, asthma, pulmonary embolus and congestive heart failure. (Dimple Bui)


Other Provider: 





PHYSICIAN DOCUMENTATION:


The patient was evaluated and managed by the Physician Assistant and myself.  I 

have reviewed the chart and agree with the findings and plan of care as 

documented.  In addition, I examined the patient myself at 1420.  History 

confirmed as cough and fever since yesterday. Physical findings as follows:  

Bilateral rhonchi and wheezing.





Pneumonia and flu positive, lactate elevated.  IV fluids and 2nd lactate, does 

not have evidence of septic shock at this time in the ED.


Looks moderately ill, plan for admission at least overnight for the IV 

antibiotics and supportive care.





I am the secondary supervising physician. (Ellis Quinteros)





- Data Points


Laboratory Results: 


 Laboratory Results





 11/07/17 11:44 





 11/07/17 11:44 








Medications Given: 


 





Acetaminophen (Tylenol)  500 - 1,000 mg PO Q6HRS PRN


   PRN Reason: Pain, Mild/Fever, Can Take PO


   Stop: 05/06/18 14:13


   Last Admin: 11/08/17 07:27 Dose:  1,000 mg


Albuterol/Ipratropium (Duoneb)  3 ml IH QID MYRON


   Stop: 05/06/18 15:59


   Last Admin: 11/08/17 05:46 Dose:  Not Given


Benzonatate (Tessalon Pearles)  200 mg PO TID PRN


   PRN Reason: Cough, Mild


   Stop: 05/06/18 15:04


   Last Admin: 11/08/17 07:27 Dose:  200 mg


Fluticasone Propionate (Flonase Nasal Spray)  2 sprays EACHNARE DAILY [facility]


   Stop: 05/06/18 21:44


   Last Admin: 11/08/17 07:28 Dose:  2 sprays


Guaifenesin (Mucinex)  1,200 mg PO BID [facility]RON


   Stop: 05/06/18 20:59


   Last Admin: 11/08/17 07:27 Dose:  1,200 mg


Guaifenesin/Codeine Phosphate (Robitussin Ac)  10 ml PO Q6HRS PRN


   PRN Reason: Cough, Moderate


   Stop: 05/06/18 15:04


   Last Admin: 11/08/17 07:26 Dose:  10 ml


Sodium Chloride (Ns)  1,000 mls @ 150 mls/hr IV CONT MYRON


   Stop: 05/06/18 14:14


   Last Admin: 11/08/17 01:15 Dose:  1,000 mls


Nicotine (Nicoderm Cq)  21 mg TD DAILY [facility]


   Stop: 05/06/18 14:14


   Last Admin: 11/08/17 07:28 Dose:  21 mg


Nicotine Polacrilex (Nicorette)  2 mg B Q1HR PRN


   PRN Reason: Nicotine Withdrawal


   Stop: 05/06/18 14:13


   Last Admin: 11/08/17 07:27 Dose:  2 mg


Ondansetron HCl (Zofran)  4 mg IVP Q4HRS PRN


   PRN Reason: Nausea/Vomiting, Can't Take PO


   Stop: 05/06/18 14:13


   Last Admin: 11/08/17 02:59 Dose:  4 mg


Oseltamivir Phosphate (Tamiflu)  75 mg PO BIDMEAL [facility]


   Stop: 11/12/17 08:01


   Last Admin: 11/08/17 07:27 Dose:  75 mg


Pantoprazole Sodium (Protonix)  40 mg PO DAILY [facility]


   Stop: 05/07/18 08:59


   Last Admin: 11/08/17 07:27 Dose:  40 mg


Prednisone (Prednisone)  60 mg PO DAILY [facility]


   Stop: 05/06/18 14:44


   Last Admin: 11/08/17 07:26 Dose:  60 mg





Discontinued Medications





Acetaminophen (Tylenol)  1,000 mg PO EDNOW ONE


   Stop: 11/07/17 13:39


   Last Admin: 11/07/17 13:39 Dose:  1,000 mg


Albuterol (Proventil Neb)  3 ml NEB EDNOW ONE


   Stop: 11/07/17 14:27


   Last Admin: 11/07/17 14:45 Dose:  3 ml


Albuterol/Ipratropium (Duoneb)  3 ml IH EDNOW ONE


   Stop: 11/07/17 10:57


   Last Admin: 11/07/17 11:11 Dose:  3 ml


Sodium Chloride (Ns)  1,000 mls @ 0 mls/hr IV EDNOW ONE


   PRN Reason: Wide Open


   Stop: 11/07/17 14:03


   Last Admin: 11/07/17 14:03 Dose:  1,000 mls


Levofloxacin/Dextrose (Levaquin 750 Mg (Premix))  150 mls @ 100 mls/hr IV EDNOW 

ONE


   PRN Reason: Protocol


   Stop: 11/07/17 15:36


   Last Admin: 11/07/17 14:45 Dose:  150 mls


Sodium Chloride (Ns)  2,600 mls @ 5,200 mls/hr 30 ml/kg infuse over 30 min (

2600 ml) IV EDNOW ONE


   PRN Reason: Protocol


   Stop: 11/07/17 14:36


   Last Admin: 11/07/17 14:57 Dose:  2,600 mls


Ondansetron HCl (Zofran)  4 mg IVP EDNOW ONE


   Stop: 11/07/17 14:03


   Last Admin: 11/07/17 14:04 Dose:  4 mg


Oseltamivir Phosphate (Tamiflu)  75 mg PO EDNOW ONE


   Stop: 11/07/17 14:13


   Last Admin: 11/07/17 14:45 Dose:  75 mg








Departure





- Departure


Disposition: [facility]


Clinical Impression: 


 Influenza A





Pneumonia


Qualifiers:


 Pneumonia type: due to unspecified organism Laterality: left Lung location: 

lower lobe of lung Qualified Code(s): J18.1 - Lobar pneumonia, unspecified 

organism





Condition: Good

## 2017-11-07 NOTE — PDGENHP
History and Physical





- Chief Complaint


Acute cough





- History of Present Illness


Primary care provider:  Cinthya Galicia





HPI:  57-year-old male presenting with cough characterized as productive, with 

associated fever, general malaise, nausea.  Patient reports onset of symptoms 

on the day prior, and duration has been persistent thereafter.  His fever has 

been somewhat alleviated by naproxen at home.  He reports that his symptoms are 

very similar in character to his previous episode of pneumonia.  Patient has 

otherwise not been taking any recent antibiotics.





History Information





- Allergies/Home Medication List


Allergies/Adverse Reactions: 








No Known Allergies Allergy (Verified 11/07/17 10:35)


 





Home Medications: 








Albuterol [Proventil Inhaler HFA (*)] 1 - 2 puffs IH DAILY PRN 11/07/17 [Last 

Taken 3 Days Ago ~11/04/17]


Esomeprazole Magnesium [Nexium 24Hr] 20 mg PO DAILY 11/07/17 [Last Taken 11/07/ 17]


Fluticasone/Salmeter 250/50Mcg [Advair 250/50 (*)] 1 puffs IH BID 11/07/17 [

Last Taken 3 Days Ago ~11/04/17]


Naproxen 500 mg PO BID PRN 11/07/17 [Last Taken Unknown]


Trimix Injection 1 each IJ DAILY PRN 11/07/17 [Last Taken Unknown]





I have personally reviewed and updated: family history, medical history, social 

history, surgical history





- Past Medical History


COPD


Additional medical history: Hepatitis C virus without any treatment.  Low 

testosterone level.  Abdominal pain currently undergoing outpatient workup at 

[facility]





- Surgical History


Reports: appendectomy





- Family History


Additional family history: No family history of pulmonary issues or venous 

thromboembolism





- Social History


Smoking Status: Current every day smoker


Alcohol Use: Occasionally


Drug Use: None


Additional social history: Independent in his ADLs





Review of Systems


Review of Systems: 





ROS: 10pt was reviewed & negative except for what was stated in HPI & below


Constitutional: Reports: fever, malaise


Respiratory: Reports: cough





Physical Exam


Physical Exam: 

















Temp Pulse Resp BP Pulse Ox


 


 37.3 C   85   20   136/82 H  97 


 


 11/07/17 13:06  11/07/17 14:44  11/07/17 14:44  11/07/17 14:44  11/07/17 14:44




















O2 (L/minute)                  2














Constitutional: no apparent distress, not in pain, obese, uncomfortable


Eyes: PERRL, anicteric sclera, EOMI


Ears, Nose, Mouth, Throat: moist mucous membranes, hearing normal, ears appear 

normal, no oral mucosal ulcers


Cardiovascular: regular rate and rhythym, no murmur, rub, or gallop, No edema


Respiratory: expiratory wheeze, bronchial breath sounds, rhonchi (Mid posterior 

segment on the left side), No inspiratory crackles


Gastrointestinal: normoactive bowel sounds, soft, non-tender abdomen, no 

palpable masses, distension (Moderate)


Skin: other (Arthur face, vitiligo over the frontal scalp)


Neurologic: AAOx3, sensation intact bilaterally, No weakness


Psychiatric: interacting appropriately, not anxious, not encephalopathic, 

thought process linear





Lab Data & Imaging Review





 11/07/17 11:44





 11/07/17 11:44














WBC  15.05 10^3/uL (3.80-9.50)  H  11/07/17  11:44    


 


RBC  4.79 10^6/uL (4.40-6.38)   11/07/17  11:44    


 


Hgb  15.6 g/dL (13.7-17.5)   11/07/17  11:44    


 


Hct  43.6 % (40.0-51.0)   11/07/17  11:44    


 


MCV  91.0 fL (81.5-99.8)   11/07/17  11:44    


 


MCH  32.6 pg (27.9-34.1)   11/07/17  11:44    


 


MCHC  35.8 g/dL (32.4-36.7)   11/07/17  11:44    


 


RDW  12.8 % (11.5-15.2)   11/07/17  11:44    


 


Plt Count  93 10^3/uL (150-400)  L  11/07/17  11:44    


 


MPV  12.0 fL (8.7-11.7)  H  11/07/17  11:44    


 


Neut % (Auto)  83.6 % (39.3-74.2)  H  11/07/17  11:44    


 


Lymph % (Auto)  8.9 % (15.0-45.0)  L  11/07/17  11:44    


 


Mono % (Auto)  6.5 % (4.5-13.0)   11/07/17  11:44    


 


Eos % (Auto)  0.1 % (0.6-7.6)  L  11/07/17  11:44    


 


Baso % (Auto)  0.4 % (0.3-1.7)   11/07/17  11:44    


 


Nucleat RBC Rel Count  0.0 % (0.0-0.2)   11/07/17  11:44    


 


Absolute Neuts (auto)  12.59 10^3/uL (1.70-6.50)  H  11/07/17  11:44    


 


Absolute Lymphs (auto)  1.34 10^3/uL (1.00-3.00)   11/07/17  11:44    


 


Absolute Monos (auto)  0.98 10^3/uL (0.30-0.80)  H  11/07/17  11:44    


 


Absolute Eos (auto)  0.01 10^3/uL (0.03-0.40)  L  11/07/17  11:44    


 


Absolute Basos (auto)  0.06 10^3/uL (0.02-0.10)   11/07/17  11:44    


 


Absolute Nucleated RBC  0.00 10^3/uL (0-0.01)   11/07/17  11:44    


 


Immature Gran %  0.5 % (0.0-1.1)   11/07/17  11:44    


 


Immature Gran #  0.07 10^3/uL (0.00-0.10)   11/07/17  11:44    


 


VBG Lactic Acid  2.9 mmol/L (0.7-2.1)  H  11/07/17  11:44    


 


Sodium  141 mEq/L (134-144)   11/07/17  11:44    


 


Potassium  3.9 mEq/L (3.5-5.2)   11/07/17  11:44    


 


Chloride  102 mEq/L ()   11/07/17  11:44    


 


Carbon Dioxide  20 mEq/l (22-31)  L  11/07/17  11:44    


 


Anion Gap  19 mEq/L (8-16)  H  11/07/17  11:44    


 


BUN  9 mg/dL (7-23)   11/07/17  11:44    


 


Creatinine  1.0 mg/dL (0.7-1.3)   11/07/17  11:44    


 


Estimated GFR  > 60   11/07/17  11:44    


 


Glucose  108 mg/dL ()  H  11/07/17  11:44    


 


Calcium  9.0 mg/dL (8.5-10.4)   11/07/17  11:44    


 


Nasal Influenza A PCR  FLU A DETECTED  (NEGATIVE)   11/07/17  12:30    


 


Nasal Influenza B PCR  NEGATIVE FOR FLU B  (NEGATIVE)   11/07/17  12:30    


 


Group A Strep Screen  NEGATIVE  (NEGATIVE)   11/07/17  11:30    








Visualized and Interpreted Chest x-ray results: Yes


Chest X-Ray results: other (Possible patchy left-sided infiltrate)





Assessment & Plan


Assessment: 





57-year-old male presents with viral influenza a pneumonia complicated by acute 

COPD exacerbation





Plan: 





1.  Influenza a pneumonia.  Present on admission, acute, new problem this 

provider, further workup indicated, evidenced by patchy left lower lobe 

infiltrate with leukocytosis, fever, tachycardia, positive influenza a swab


-initiated on Tamiflu, continue 75 mg twice daily x5 days


-continue IV fluids


-discussed with Dimple Bui, emergency department provider, the patient has 

received 1 dose of IV levofloxacin, will hold on further antibiotics and check 

a procalcitonin level to see of his is substantially elevated which would 

correspond to a bacterial superinfection





2. Acute COPD exacerbation.  Evidenced by diffuse expiratory wheezes and 

bronchial breath sounds in the setting of an ongoing smoker, likely triggered 

by above


-initiate on steroids, prednisone 60 mg x5 days


-placed on scheduled DuoNeb treatment


-supportive care with Mucinex, Tessalon Perles, guaifenesin/codeine





3. Hepatitis-C virus.  Chronic, untreated, most likely contributing to 

thrombocytopenia with review of outside records including 3/11/2015 CT of the 

chest demonstrating some hepatic steatosis


-recommend outpatient management and follow-up at [facility]


-will get HIV test, verbal consent provided by patient





4. Testosterone deficiency.  Per patient report, his primary care provider has 

ordered hormonal studies, will perform given that the patient is missing his 

lab appointment today





5. Metabolic acidosis.  Acute, secondary to lactic acid, lactic 2.9, rechecking 

serum level at this time


-continue IV fluids, anticipate potentially delayed clearance in the setting of 

underlying liver disease





Diet.  Regular





Prophylaxis.  Low risk patient, SCDs





Code.  Full per patient, his ex-wife is MD POA





Disposition.  Anticipated discharge uncertain this time, anticipated length 

stay greater than 48 hours warranting inpatient admission status for acute 

influenza a pneumonia complicated by high risk acute COPD exacerbation.

## 2017-11-07 NOTE — EDPHY
H & P


Smoking Status: Current every day smoker


Time Seen by Provider: 11/07/17 11:24


HPI/ROS: 





CHIEF COMPLAINT:  Cough, shortness of breath





HISTORY OF PRESENT ILLNESS:  57-year-old male presents to the emergency 

department with productive cough and fever since yesterday.  Patient is a 

chronic smoker.  He has had productive cough over last few days which became 

acutely worse yesterday.  He does not receive flu shot.  He did receive the 

pneumonia vaccine 6 months ago.  He denies abdominal pain.  Denies pain in his 

chest.  He feels short of breath especially when he is coughing.  No vomiting 

or diarrhea.  No known ill contacts.  No recent travel.  No rash.  No headache.





REVIEW OF SYSTEMS:


Constitutional:  Subjective fevers, chills


Eyes:  No double or blurry vision.


ENT:  Sore throat


Respiratory:  Cough, shortness of breath as above.


Cardiac:  No chest pain.


Gastrointestinal:  No abdominal pain, vomiting or diarrhea.


Genitourinary:  No dysuria.


Musculoskeletal:  No neck or back pain.


Skin:  No rashes.


Neurological:  No headache. (Dimple Bui)


Past Medical/Surgical History: 





Alcoholism, hepatitis-C, COPD, history of pneumonia (Ramya,Dimple M)


Social History: 





Single and lives in [place] (Ramya,Dimple M)


Physical Exam: 





General Appearance:  Alert, no distress.  Initial temperature 38.0degrees, 92% 

on room air


Eyes:  Pupils equal and round.  Extraocular motions are all intact.


ENT:  Mouth:  Mucous membranes moist.


Respiratory:  Diffuse expiratory wheezing throughout specially in the left 

base.  Rales in the left base.  No respiratory distress.


Cardiovascular:  Regular rate and rhythm.


Gastrointestinal:  Abdomen is soft and nontender, no masses, no rebound or 

guarding, bowel sounds normal.


Neurological:  Alert and oriented x 3, cranial nerves II through XII grossly 

intact


Skin:  Warm and dry, no rashes.


Musculoskeletal:  Nontender to palpate along the cervical, thoracic or lumbar 

spine.  Neck is supple.


Extremities:  Full range of motion and no peripheral edema.


Psychiatric:  Patient is oriented X 3, there is no agitation. (Dimple Bui M)


Constitutional: 


 Initial Vital Signs











Temperature (C)  38 C   11/07/17 10:37


 


Heart Rate  90   11/07/17 10:37


 


Respiratory Rate  19   11/07/17 10:37


 


Blood Pressure  140/66 H  11/07/17 10:37


 


O2 Sat (%)  92   11/07/17 10:37








 











O2 Delivery Mode               Room Air














Allergies/Adverse Reactions: 


 





No Known Allergies Allergy (Verified 11/07/17 10:35)


 








Home Medications: 














 Medication  Instructions  Recorded


 


Albuterol [Proventil Inhaler HFA 1 - 2 puffs IH DAILY PRN 11/07/17





(*)]  


 


Esomeprazole Magnesium [Nexium 20 mg PO DAILY 11/07/17





24Hr]  


 


Fluticasone/Salmeter 250/50Mcg 1 puffs IH BID 11/07/17





[Advair 250/50 (*)]  


 


Naproxen 500 mg PO BID PRN 11/07/17


 


Trimix Injection 1 each IJ DAILY PRN 11/07/17














Medical Decision Making





- Diagnostics


Imaging: Discussed imaging studies w/ On call Radiologist


ED Course/Re-evaluation: 





57-year-old male presents with productive cough and fever.





Influenza a was positive.  Rapid strep was negative.  White blood cell count [place] 

[place]vated at [address]. His chemistries reveal normal sodium and potassium.  CO2 was 

slightly low at 20. Patient received IV normal saline.





Initial venous lactate was 2.9.  I spoke with the patient about admission to 

the hospital, however the patient declined.





Chest x-ray reveals likely left lower lobe pneumonia.





Patient was kept on O2 saturation which remained in the upper 80s to low 90s on 

room air.  I recommended admission to the hospital and the patient now agrees 

with admission to the hospital.  He will be started on IV Levaquin 750 mg. He 

was also given 75 mg of Tamiflu p. o. since he feels that his symptoms became 

acutely worse yesterday.





Patient will be admitted to the hospitalist, Dr. Rhoades.





Based on the patient's laboratory studies and venous lactic, the patient meets 

criteria for severe sepsis.  I do not see any evidence for severe septic shock.

  Blood pressure is normal.  He is not hypotensive.





The case was discussed with Dr. Ellis Quinteros, secondary supervising physician, who 

also evaluated the patient. (Dimple Bui)


Differential Diagnosis: 





Shortness of breath including but not limited to pulmonary infectious process, 

COPD, asthma, pulmonary embolus and congestive heart failure. (Dimple Bui)


Other Provider: 





PHYSICIAN DOCUMENTATION:


The patient was evaluated and managed by the Physician Assistant and myself.  I 

have reviewed the chart and agree with the findings and plan of care as 

documented.  In addition, I examined the patient myself at 1420.  History 

confirmed as cough and fever since yesterday. Physical findings as follows:  

Bilateral rhonchi and wheezing.





Pneumonia and flu positive, lactate elevated.  IV fluids and 2nd lactate, does 

not have evidence of septic shock at this time in the ED.


Looks moderately ill, plan for admission at least overnight for the IV 

antibiotics and supportive care.





I am the secondary supervising physician. (Ellis Quinteros)





- Data Points


Laboratory Results: 


 Laboratory Results





 11/07/17 11:44 





 11/07/17 11:44 








Medications Given: 


 





Acetaminophen (Tylenol)  500 - 1,000 mg PO Q6HRS PRN


   PRN Reason: Pain, Mild/Fever, Can Take PO


   Stop: 05/06/18 14:13


   Last Admin: 11/08/17 07:27 Dose:  1,000 mg


Albuterol/Ipratropium (Duoneb)  3 ml IH QID MYRON


   Stop: 05/06/18 15:59


   Last Admin: 11/08/17 05:46 Dose:  Not Given


Benzonatate (Tessalon Pearles)  200 mg PO TID PRN


   PRN Reason: Cough, Mild


   Stop: 05/06/18 15:04


   Last Admin: 11/08/17 07:27 Dose:  200 mg


Fluticasone Propionate (Flonase Nasal Spray)  2 sprays EACHNARE DAILY MYRON


   Stop: 05/06/18 21:44


   Last Admin: 11/08/17 07:28 Dose:  2 sprays


Guaifenesin (Mucinex)  1,200 mg PO BID MYRON


   Stop: 05/06/18 20:59


   Last Admin: 11/08/17 07:27 Dose:  1,200 mg


Guaifenesin/Codeine Phosphate (Robitussin Ac)  10 ml PO Q6HRS PRN


   PRN Reason: Cough, Moderate


   Stop: 05/06/18 15:04


   Last Admin: 11/08/17 07:26 Dose:  10 ml


Sodium Chloride (Ns)  1,000 mls @ 150 mls/hr IV CONT MYRON


   Stop: 05/06/18 14:14


   Last Admin: 11/08/17 01:15 Dose:  1,000 mls


Nicotine (Nicoderm Cq)  21 mg TD DAILY MYRON


   Stop: 05/06/18 14:14


   Last Admin: 11/08/17 07:28 Dose:  21 mg


Nicotine Polacrilex (Nicorette)  2 mg B Q1HR PRN


   PRN Reason: Nicotine Withdrawal


   Stop: 05/06/18 14:13


   Last Admin: 11/08/17 07:27 Dose:  2 mg


Ondansetron HCl (Zofran)  4 mg IVP Q4HRS PRN


   PRN Reason: Nausea/Vomiting, Can't Take PO


   Stop: 05/06/18 14:13


   Last Admin: 11/08/17 02:59 Dose:  4 mg


Oseltamivir Phosphate (Tamiflu)  75 mg PO BIDMEAL [facility]


   Stop: 11/12/17 08:01


   Last Admin: 11/08/17 07:27 Dose:  75 mg


Pantoprazole Sodium (Protonix)  40 mg PO DAILY [facility]


   Stop: 05/07/18 08:59


   Last Admin: 11/08/17 07:27 Dose:  40 mg


Prednisone (Prednisone)  60 mg PO DAILY [facility]


   Stop: 05/06/18 14:44


   Last Admin: 11/08/17 07:26 Dose:  60 mg





Discontinued Medications





Acetaminophen (Tylenol)  1,000 mg PO EDNOW ONE


   Stop: 11/07/17 13:39


   Last Admin: 11/07/17 13:39 Dose:  1,000 mg


Albuterol (Proventil Neb)  3 ml NEB EDNOW ONE


   Stop: 11/07/17 14:27


   Last Admin: 11/07/17 14:45 Dose:  3 ml


Albuterol/Ipratropium (Duoneb)  3 ml IH EDNOW ONE


   Stop: 11/07/17 10:57


   Last Admin: 11/07/17 11:11 Dose:  3 ml


Sodium Chloride (Ns)  1,000 mls @ 0 mls/hr IV EDNOW ONE


   PRN Reason: Wide Open


   Stop: 11/07/17 14:03


   Last Admin: 11/07/17 14:03 Dose:  1,000 mls


Levofloxacin/Dextrose (Levaquin 750 Mg (Premix))  150 mls @ 100 mls/hr IV EDNOW 

ONE


   PRN Reason: Protocol


   Stop: 11/07/17 15:36


   Last Admin: 11/07/17 14:45 Dose:  150 mls


Sodium Chloride (Ns)  2,600 mls @ 5,200 mls/hr 30 ml/kg infuse over 30 min (

2600 ml) IV EDNOW ONE


   PRN Reason: Protocol


   Stop: 11/07/17 14:36


   Last Admin: 11/07/17 14:57 Dose:  2,600 mls


Ondansetron HCl (Zofran)  4 mg IVP EDNOW ONE


   Stop: 11/07/17 14:03


   Last Admin: 11/07/17 14:04 Dose:  4 mg


Oseltamivir Phosphate (Tamiflu)  75 mg PO EDNOW ONE


   Stop: 11/07/17 14:13


   Last Admin: 11/07/17 14:45 Dose:  75 mg








Departure





- Departure


Disposition: [facility]


Clinical Impression: 


 Influenza A





Pneumonia


Qualifiers:


 Pneumonia type: due to unspecified organism Laterality: left Lung location: 

lower lobe of lung Qualified Code(s): J18.1 - Lobar pneumonia, unspecified 

organism





Condition: Good

## 2017-11-08 LAB — PLATELET # BLD: 73 10^3/UL (ref 150–400)

## 2017-11-08 RX ADMIN — IPRATROPIUM BROMIDE AND ALBUTEROL SULFATE SCH: .5; 3 SOLUTION RESPIRATORY (INHALATION) at 05:46

## 2017-11-08 RX ADMIN — IPRATROPIUM BROMIDE AND ALBUTEROL SULFATE SCH ML: .5; 3 SOLUTION RESPIRATORY (INHALATION) at 21:31

## 2017-11-08 RX ADMIN — ONDANSETRON PRN MG: 2 SOLUTION INTRAMUSCULAR; INTRAVENOUS at 02:59

## 2017-11-08 RX ADMIN — IPRATROPIUM BROMIDE AND ALBUTEROL SULFATE SCH ML: .5; 3 SOLUTION RESPIRATORY (INHALATION) at 15:58

## 2017-11-08 RX ADMIN — OSELTAMIVIR PHOSPHATE SCH MG: 75 CAPSULE ORAL at 17:57

## 2017-11-08 RX ADMIN — NAPROXEN SODIUM PRN MG: 220 TABLET, FILM COATED ORAL at 21:26

## 2017-11-08 RX ADMIN — OSELTAMIVIR PHOSPHATE SCH MG: 75 CAPSULE ORAL at 07:27

## 2017-11-08 RX ADMIN — SODIUM CHLORIDE SCH MLS: 900 INJECTION, SOLUTION INTRAVENOUS at 01:15

## 2017-11-08 RX ADMIN — BENZONATATE PRN MG: 100 CAPSULE, LIQUID FILLED ORAL at 12:25

## 2017-11-08 RX ADMIN — PANTOPRAZOLE SODIUM SCH MG: 40 TABLET, DELAYED RELEASE ORAL at 07:27

## 2017-11-08 RX ADMIN — NICOTINE SCH MG: 21 PATCH, EXTENDED RELEASE TOPICAL at 07:28

## 2017-11-08 RX ADMIN — FLUTICASONE PROPIONATE SCH SPRAYS: 50 SPRAY, METERED NASAL at 07:28

## 2017-11-08 RX ADMIN — BENZONATATE PRN MG: 100 CAPSULE, LIQUID FILLED ORAL at 07:27

## 2017-11-08 RX ADMIN — GUAIFENESIN SCH MG: 600 TABLET, EXTENDED RELEASE ORAL at 19:34

## 2017-11-08 RX ADMIN — ONDANSETRON PRN MG: 2 SOLUTION INTRAMUSCULAR; INTRAVENOUS at 19:34

## 2017-11-08 RX ADMIN — GUAIFENESIN SCH MG: 600 TABLET, EXTENDED RELEASE ORAL at 07:27

## 2017-11-08 RX ADMIN — GUAIFENESIN AND CODEINE PHOSPHATE PRN ML: 10; 100 LIQUID ORAL at 21:25

## 2017-11-08 RX ADMIN — NAPROXEN SODIUM PRN MG: 220 TABLET, FILM COATED ORAL at 12:25

## 2017-11-08 RX ADMIN — BENZONATATE PRN MG: 100 CAPSULE, LIQUID FILLED ORAL at 21:25

## 2017-11-08 RX ADMIN — IPRATROPIUM BROMIDE AND ALBUTEROL SULFATE SCH ML: .5; 3 SOLUTION RESPIRATORY (INHALATION) at 10:36

## 2017-11-08 RX ADMIN — GUAIFENESIN AND CODEINE PHOSPHATE PRN ML: 10; 100 LIQUID ORAL at 07:26

## 2017-11-08 NOTE — HOSPPROG
Hospitalist Progress Note


Assessment/Plan: 





57-year-old male presents with viral influenza a pneumonia complicated by acute 

COPD exacerbation. First encounter, chart reviewed, D/W RN.





Plan: 





1.  Influenza a pneumonia.  


- Present on admission, acute,


- patchy left lower lobe infiltrate with leukocytosis, fever, tachycardia, 

positive influenza a swab


-initiated on Tamiflu, continue 75 mg twice daily x5 days


-IV fluids


-cont levofloxacin





2. Acute COPD exacerbation.  


-on steroids, prednisone 60 mg x5 days


-placed on scheduled DuoNeb treatment


-supportive care with Mucinex, Tessalon Perles, guaifenesin/codeine





3. Hepatitis-C virus.  


-recommend outpatient management and follow-up at [facility]


-HIV test, negative 





4. Testosterone deficiency. 


- Per patient report, hormonal studies pending





5. Metabolic acidosis.  


-secondary to lactic acid, lactic 2.9, recheck improved


- IV fluids, anticipate potentially delayed clearance in the setting of 

underlying liver disease





Diet.  Regular





Prophylaxis.  Low risk patient, SCDs





Code.  Full per patient, his ex-wife is MD POA





Disposition.  1-2 more days pending improvement


Subjective: Up in chair. Feels better. Still feeling dizzy.


Objective: 


 Vital Signs











Temp Pulse Resp BP Pulse Ox


 


 36.9 C   76   16   147/92 H  91 L


 


 11/08/17 12:00  11/08/17 12:00  11/08/17 12:00  11/08/17 12:00  11/08/17 12:00








 Laboratory Results





 11/08/17 04:50 





 11/08/17 04:50 





 











 11/07/17 11/08/17 11/09/17





 05:59 05:59 05:59


 


Intake Total  3063 


 


Balance  3063 














- Physical Exam


Constitutional: no apparent distress, appears nourished, not in pain


Eyes: PERRL, anicteric sclera, EOMI


Ears, Nose, Mouth, Throat: moist mucous membranes, hearing normal, ears appear 

normal


Cardiovascular: regular rate and rhythym, No JVD, No edema


Respiratory: no respiratory distress, no rales or rhonchi, reduced air movement


Gastrointestinal: normoactive bowel sounds, No tenderness, No ascites


Skin: warm, normal color, No erythema


Musculoskeletal: normal joint ROM, no joint effusions, generalized weakness


Neurologic: AAOx3


Psychiatric: interacting appropriately, not anxious, not encephalopathic, 

thought process linear





ICD10 Worksheet


Patient Problems: 


 Problems











Problem Status Onset


 


Pneumonitis Acute  


 


Abdominal pain Acute  


 


Pneumonia Acute  


 


Influenza A Acute

## 2017-11-08 NOTE — HOSPPROG
Hospitalist Progress Note


Assessment/Plan: 





57-year-old male presents with viral influenza a pneumonia complicated by acute 

COPD exacerbation. First encounter, chart reviewed, D/W RN.





Plan: 





1.  Influenza a pneumonia.  


- Present on admission, acute,


- patchy left lower lobe infiltrate with leukocytosis, fever, tachycardia, 

positive influenza a swab


-initiated on Tamiflu, continue 75 mg twice daily x5 days


-IV fluids


-cont levofloxacin





2. Acute COPD exacerbation.  


-on steroids, prednisone 60 mg x5 days


-placed on scheduled DuoNeb treatment


-supportive care with Mucinex, Tessalon Perles, guaifenesin/codeine





3. Hepatitis-C virus.  


-recommend outpatient management and follow-up at [facility]


-HIV test, negative 





4. Testosterone deficiency. 


- Per patient report, hormonal studies pending





5. Metabolic acidosis.  


-secondary to lactic acid, lactic 2.9, recheck improved


- IV fluids, anticipate potentially delayed clearance in the setting of 

underlying liver disease





Diet.  Regular





Prophylaxis.  Low risk patient, SCDs





Code.  Full per patient, his ex-wife is MD POA





Disposition.  1-2 more days pending improvement


Subjective: Up in chair. Feels better. Still feeling dizzy.


Objective: 


 Vital Signs











Temp Pulse Resp BP Pulse Ox


 


 36.9 C   76   16   147/92 H  91 L


 


 11/08/17 12:00  11/08/17 12:00  11/08/17 12:00  11/08/17 12:00  11/08/17 12:00








 Laboratory Results





 11/08/17 04:50 





 11/08/17 04:50 





 











 11/07/17 11/08/17 11/09/17





 05:59 05:59 05:59


 


Intake Total  3063 


 


Balance  3063 














- Physical Exam


Constitutional: no apparent distress, appears nourished, not in pain


Eyes: PERRL, anicteric sclera, EOMI


Ears, Nose, Mouth, Throat: moist mucous membranes, hearing normal, ears appear 

normal


Cardiovascular: regular rate and rhythym, No JVD, No edema


Respiratory: no respiratory distress, no rales or rhonchi, reduced air movement


Gastrointestinal: normoactive bowel sounds, No tenderness, No ascites


Skin: warm, normal color, No erythema


Musculoskeletal: normal joint ROM, no joint effusions, generalized weakness


Neurologic: AAOx3


Psychiatric: interacting appropriately, not anxious, not encephalopathic, 

thought process linear





ICD10 Worksheet


Patient Problems: 


 Problems











Problem Status Onset


 


Pneumonitis Acute  


 


Abdominal pain Acute  


 


Pneumonia Acute  


 


Influenza A Acute

## 2017-11-08 NOTE — PDMN
Medical Necessity


Medical necessity: Pt meets IP criteria per MD; est los >2 mn for eval/tx of 

acute influenza A pneumonia complicated by high risk acute COPD exacerbation, 

acute metabolic acidosis; per H&P & order 11/7/17

## 2017-11-08 NOTE — ASMTCMCOM
CM Note

 

CM Note                       

Notes:

Patient admitted with viral influenza pneumonia complicated by acute COPD exacerbation. 



Initial PT eval suggests that patient will discharge home independently. Hospital medicine note 

suggests that patient will be inpatient 1-2 more days pending clinical improvement. CM available if 


any discharge needs arise.



CM Discharge plan: home independent

 

Date Signed:  11/08/2017 01:30 PM

Electronically Signed By:Ivanna Veloz RN

## 2017-11-08 NOTE — HOSPPROG
Hospitalist Progress Note


Assessment/Plan: 





57-year-old male presents with viral influenza a pneumonia complicated by acute 

COPD exacerbation. First encounter, chart reviewed, D/W RN.





Plan: 





1.  Influenza a pneumonia.  


- Present on admission, acute,


- patchy left lower lobe infiltrate with leukocytosis, fever, tachycardia, 

positive influenza a swab


-initiated on Tamiflu, continue 75 mg twice daily x5 days


-IV fluids


-cont levofloxacin





2. Acute COPD exacerbation.  


-on steroids, prednisone 60 mg x5 days


-placed on scheduled DuoNeb treatment


-supportive care with Mucinex, Tessalon Perles, guaifenesin/codeine





3. Hepatitis-C virus.  


-recommend outpatient management and follow-up at [facility]


-HIV test, negative 





4. Testosterone deficiency. 


- Per patient report, hormonal studies pending





5. Metabolic acidosis.  


-secondary to lactic acid, lactic 2.9, recheck improved


- IV fluids, anticipate potentially delayed clearance in the setting of 

underlying liver disease





Diet.  Regular





Prophylaxis.  Low risk patient, SCDs





Code.  Full per patient, his ex-wife is MD POA





Disposition.  1-2 more days pending improvement


Subjective: Up in chair. Feels better. Still feeling dizzy.


Objective: 


 Vital Signs











Temp Pulse Resp BP Pulse Ox


 


 36.9 C   76   16   147/92 H  91 L


 


 11/08/17 12:00  11/08/17 12:00  11/08/17 12:00  11/08/17 12:00  11/08/17 12:00








 Laboratory Results





 11/08/17 04:50 





 11/08/17 04:50 





 











 11/07/17 11/08/17 11/09/17





 05:59 05:59 05:59


 


Intake Total  3063 


 


Balance  3063 














- Physical Exam


Constitutional: no apparent distress, appears nourished, not in pain


Eyes: PERRL, anicteric sclera, EOMI


Ears, Nose, Mouth, Throat: moist mucous membranes, hearing normal, ears appear 

normal


Cardiovascular: regular rate and rhythym, No JVD, No edema


Respiratory: no respiratory distress, no rales or rhonchi, reduced air movement


Gastrointestinal: normoactive bowel sounds, No tenderness, No ascites


Skin: warm, normal color, No erythema


Musculoskeletal: normal joint ROM, no joint effusions, generalized weakness


Neurologic: AAOx3


Psychiatric: interacting appropriately, not anxious, not encephalopathic, 

thought process linear





ICD10 Worksheet


Patient Problems: 


 Problems











Problem Status Onset


 


Pneumonitis Acute  


 


Abdominal pain Acute  


 


Pneumonia Acute  


 


Influenza A Acute

## 2017-11-09 VITALS
DIASTOLIC BLOOD PRESSURE: 113 MMHG | OXYGEN SATURATION: 94 % | RESPIRATION RATE: 18 BRPM | HEART RATE: 88 BPM | TEMPERATURE: 97.9 F | SYSTOLIC BLOOD PRESSURE: 170 MMHG

## 2017-11-09 LAB — PLATELET # BLD: 84 10^3/UL (ref 150–400)

## 2017-11-09 RX ADMIN — NAPROXEN SODIUM PRN MG: 220 TABLET, FILM COATED ORAL at 09:14

## 2017-11-09 RX ADMIN — IPRATROPIUM BROMIDE AND ALBUTEROL SULFATE SCH ML: .5; 3 SOLUTION RESPIRATORY (INHALATION) at 11:01

## 2017-11-09 RX ADMIN — OSELTAMIVIR PHOSPHATE SCH MG: 75 CAPSULE ORAL at 09:00

## 2017-11-09 RX ADMIN — GUAIFENESIN SCH MG: 600 TABLET, EXTENDED RELEASE ORAL at 09:00

## 2017-11-09 RX ADMIN — FLUTICASONE PROPIONATE SCH SPRAYS: 50 SPRAY, METERED NASAL at 09:03

## 2017-11-09 RX ADMIN — NICOTINE SCH MG: 21 PATCH, EXTENDED RELEASE TOPICAL at 09:01

## 2017-11-09 RX ADMIN — IPRATROPIUM BROMIDE AND ALBUTEROL SULFATE SCH ML: .5; 3 SOLUTION RESPIRATORY (INHALATION) at 05:30

## 2017-11-09 RX ADMIN — PANTOPRAZOLE SODIUM SCH MG: 40 TABLET, DELAYED RELEASE ORAL at 09:01

## 2017-11-09 RX ADMIN — GUAIFENESIN AND CODEINE PHOSPHATE PRN ML: 10; 100 LIQUID ORAL at 09:09

## 2017-11-09 NOTE — GDS
[f rep st]



                                                             DISCHARGE SUMMARY





DISCHARGE DIAGNOSES:  

1.  Influenza A pneumonia.

2.  Acute chronic obstructive pulmonary disease exacerbation.

3.  History of hepatitis C.

4.  Testosterone deficiency.

5.  Metabolic acidosis.  

6.  Hypertension.



STUDIES AND PROCEDURES DONE:  Chest x-ray.



PHYSICAL EXAMINATION:  GENERAL:  The patient is alert.  VITAL SIGNS:  Afebrile at 36.6, pulse is 88, 
respiratory rate is 18, blood pressure is 155/100.  Patient is saturating greater than 90% on room ai
r.  



I have seen and evaluated the patient on the day of discharge.



HOSPITAL COURSE:  The patient is a 57-year-old male, who presented to the emergency room with complai
nts of shortness of breath.  He was evaluated and diagnosed with:

1.  Influenza A pneumonia during this hospitalization.  He was evaluated, had a positive influenza A 
PCR.  His chest x-ray demonstrated pneumonia.  He was initiated on antibiotic therapy, as well as Navas
iflu.  His condition is significantly improved and is feeling much better.

2.  Acute hypoxemic respiratory failure.  This is in the setting of acute infection and has resolved.


3.  Acute COPD exacerbation.  The patient is responding well to prednisone, as well as DuoNeb treatme
nts.  Will continue prednisone in the outpatient setting.

4.  Hepatitis C virus.  He will follow up in the outpatient setting.  He has an appointment with [facility] the [facility] later this month.

5.  Testosterone deficiency.  He has a primary care physician who is assisting with this.  He has a t
otal testosterone and free testosterone pending at the time of disposition.

6.  Hypertension.  Patient wishes to hold off on antihypertensive at this time.  He will follow up wi
th his primary care physician, Dr. Galicia, for further recommendations in the outpatient setting.  He 
has been educated with regard to the importance of this outpatient followup.

7.  Metabolic acidosis.  This is in the setting of acute infection and has resolved.



DISPOSITION:  The patient will be discharged independently.



FOLLOWUP:  Followup will be with his primary care physician, Dr. Cinthya Galicia.  I have recommended to 
him that he seek attention regarding his hypertension during this hospitalization.



DISCHARGE MEDICATIONS:  He has been provided a prescription for Flonase, as well as Levaquin, Tamiflu
, prednisone.  These prescriptions have been filled prior to his disposition from the hospital.  He w
ill also receive Advair, as well as albuterol at the time of disposition.  



I reviewed the patient's discharge with the .  



I have spent greater than 35 minutes in the care, coordination, and management of this patient's disc
harge.





Job #:  [id number]

## 2017-11-09 NOTE — GDS
[f rep st]



                                                             DISCHARGE SUMMARY





DISCHARGE DIAGNOSES:  

1.  Influenza A pneumonia.

2.  Acute chronic obstructive pulmonary disease exacerbation.

3.  History of hepatitis C.

4.  Testosterone deficiency.

5.  Metabolic acidosis.  

6.  Hypertension.



STUDIES AND PROCEDURES DONE:  Chest x-ray.



PHYSICAL EXAMINATION:  GENERAL:  The patient is alert.  VITAL SIGNS:  Afebrile at 36.6, pulse is 88, 
respiratory rate is 18, blood pressure is 155/100.  Patient is saturating greater than 90% on room ai
r.  



I have seen and evaluated the patient on the day of discharge.



HOSPITAL COURSE:  The patient is a 57-year-old male, who presented to the emergency room with complai
nts of shortness of breath.  He was evaluated and diagnosed with:

1.  Influenza A pneumonia during this hospitalization.  He was evaluated, had a positive influenza A 
PCR.  His chest x-ray demonstrated pneumonia.  He was initiated on antibiotic therapy, as well as Navas
iflu.  His condition is significantly improved and is feeling much better.

2.  Acute hypoxemic respiratory failure.  This is in the setting of acute infection and has resolved.


3.  Acute COPD exacerbation.  The patient is responding well to prednisone, as well as DuoNeb treatme
nts.  Will continue prednisone in the outpatient setting.

4.  Hepatitis C virus.  He will follow up in the outpatient setting.  He has an appointment with [facility] of the [facility] later this month.

5.  Testosterone deficiency.  He has a primary care physician who is assisting with this.  He has a t
otal testosterone and free testosterone pending at the time of disposition.

6.  Hypertension.  Patient wishes to hold off on antihypertensive at this time.  He will follow up wi
th his primary care physician, Dr. Galicia, for further recommendations in the outpatient setting.  He 
has been educated with regard to the importance of this outpatient followup.

7.  Metabolic acidosis.  This is in the setting of acute infection and has resolved.



DISPOSITION:  The patient will be discharged independently.



FOLLOWUP:  Followup will be with his primary care physician, Dr. Cinthya Galicia.  I have recommended to 
him that he seek attention regarding his hypertension during this hospitalization.



DISCHARGE MEDICATIONS:  He has been provided a prescription for Flonase, as well as Levaquin, Tamiflu
, prednisone.  These prescriptions have been filled prior to his disposition from the hospital.  He w
ill also receive Advair, as well as albuterol at the time of disposition.  



I reviewed the patient's discharge with the .  



I have spent greater than 35 minutes in the care, coordination, and management of this patient's disc
harge.





Job #:  [id number]

## 2017-11-09 NOTE — GDS
[f rep st]



                                                             DISCHARGE SUMMARY





DISCHARGE DIAGNOSES:  

1.  Influenza A pneumonia.

2.  Acute chronic obstructive pulmonary disease exacerbation.

3.  History of hepatitis C.

4.  Testosterone deficiency.

5.  Metabolic acidosis.  

6.  Hypertension.



STUDIES AND PROCEDURES DONE:  Chest x-ray.



PHYSICAL EXAMINATION:  GENERAL:  The patient is alert.  VITAL SIGNS:  Afebrile at 36.6, pulse is 88, 
respiratory rate is 18, blood pressure is 155/100.  Patient is saturating greater than 90% on room ai
r.  



I have seen and evaluated the patient on the day of discharge.



HOSPITAL COURSE:  The patient is a 57-year-old male, who presented to the emergency room with complai
nts of shortness of breath.  He was evaluated and diagnosed with:

1.  Influenza A pneumonia during this hospitalization.  He was evaluated, had a positive influenza A 
PCR.  His chest x-ray demonstrated pneumonia.  He was initiated on antibiotic therapy, as well as Navas
iflu.  His condition is significantly improved and is feeling much better.

2.  Acute hypoxemic respiratory failure.  This is in the setting of acute infection and has resolved.


3.  Acute COPD exacerbation.  The patient is responding well to prednisone, as well as DuoNeb treatme
nts.  Will continue prednisone in the outpatient setting.

4.  Hepatitis C virus.  He will follow up in the outpatient setting.  He has an appointment with [facility] the [facility] later this month.

5.  Testosterone deficiency.  He has a primary care physician who is assisting with this.  He has a t
otal testosterone and free testosterone pending at the time of disposition.

6.  Hypertension.  Patient wishes to hold off on antihypertensive at this time.  He will follow up wi
th his primary care physician, Dr. Galicia, for further recommendations in the outpatient setting.  He 
has been educated with regard to the importance of this outpatient followup.

7.  Metabolic acidosis.  This is in the setting of acute infection and has resolved.



DISPOSITION:  The patient will be discharged independently.



FOLLOWUP:  Followup will be with his primary care physician, Dr. Cinthya Galicia.  I have recommended to 
him that he seek attention regarding his hypertension during this hospitalization.



DISCHARGE MEDICATIONS:  He has been provided a prescription for Flonase, as well as Levaquin, Tamiflu
, prednisone.  These prescriptions have been filled prior to his disposition from the hospital.  He w
ill also receive Advair, as well as albuterol at the time of disposition.  



I reviewed the patient's discharge with the .  



I have spent greater than 35 minutes in the care, coordination, and management of this patient's disc
harge.





Job #:  [id number]

## 2017-11-10 NOTE — ASDISCHSUM
----------------------------------------------

Discharge Information

----------------------------------------------

Plan Status:Home with No Needs                       Medically Cleared to Leave:

Discharge Date:11/09/2017 12:59 PM                   CM D/C Disposition:Home, Routine, Self-Care

ADT D/C Disposition:Home, Routine, Self-Care         Projected Discharge Date:11/09/2017 12:59 PM

Transportation at D/C:Self                           Discharge Delay Reason:

Follow-Up Date:11/09/2017 12:59 PM                   Discharge Slot:

Final Diagnosis:

----------------------------------------------

Placement Information

----------------------------------------------

----------------------------------------------

Patient Contact Information

----------------------------------------------

Contact Name:ROCCO                         Relationship:Other

Address:                                             Home [phone]

                                                     Work Phone:

City:[place]                                      Alternate Phone:

[facility]:[place]                                    Email:

----------------------------------------------

Financial Information

----------------------------------------------

Financial Class:MD

Primary Plan Desc[place] [place]        Primary Plan Number:[id number]

Secondary Plan Desc:                                 Secondary Plan Number:

 

 

----------------------------------------------

Assessment Information

----------------------------------------------

----------------------------------------------

[facility] CM Progress Note

----------------------------------------------

CM Note

 

CM Note                       

Notes:

Patient admitted with viral influenza pneumonia complicated by acute COPD exacerbation. 



Initial PT eval suggests that patient will discharge home independently. Hospital medicine note 

suggests that patient will be inpatient 1-2 more days pending clinical improvement. CM available if 


any discharge needs arise.



CM Discharge plan: home independent

 

Date Signed:  11/08/2017 01:30 PM

Electronically Signed By:Ivanna Veloz RN

 

 

----------------------------------------------

Intervention Information

----------------------------------------------

## 2017-11-10 NOTE — ASDISCHSUM
----------------------------------------------

Discharge Information

----------------------------------------------

Plan Status:Home with No Needs                       Medically Cleared to Leave:

Discharge Date:11/09/2017 12:59 PM                   CM D/C Disposition:Home, Routine, Self-Care

ADT D/C Disposition:Home, Routine, Self-Care         Projected Discharge Date:11/09/2017 12:59 PM

Transportation at D/C:Self                           Discharge Delay Reason:

Follow-Up Date:11/09/2017 12:59 PM                   Discharge Slot:

Final Diagnosis:

----------------------------------------------

Placement Information

----------------------------------------------

----------------------------------------------

Patient Contact Information

----------------------------------------------

Contact Name:ROCCO                         Relationship:Other

Address:                                             Home [phone]

                                                     Work Phone:

City:[place]                                      Alternate Phone:

[facility]/[employer] Code:[place]                                    Email:

----------------------------------------------

Financial Information

----------------------------------------------

Financial Class:MD

Primary Plan Desc[place] [place]        Primary Plan Number:[id number]

Secondary Plan Desc:                                 Secondary Plan Number:

 

 

----------------------------------------------

Assessment Information

----------------------------------------------

----------------------------------------------

[facility] CM Progress Note

----------------------------------------------

CM Note

 

CM Note                       

Notes:

Patient admitted with viral influenza pneumonia complicated by acute COPD exacerbation. 



Initial PT eval suggests that patient will discharge home independently. Hospital medicine note 

suggests that patient will be inpatient 1-2 more days pending clinical improvement. CM available if 


any discharge needs arise.



CM Discharge plan: home independent

 

Date Signed:  11/08/2017 01:30 PM

Electronically Signed By:Ivanna Veloz RN

 

 

----------------------------------------------

Intervention Information

----------------------------------------------

## 2017-11-10 NOTE — ASDISCHSUM
----------------------------------------------

Discharge Information

----------------------------------------------

Plan Status:Home with No Needs                       Medically Cleared to Leave:

Discharge Date:11/09/2017 12:59 PM                   CM D/C Disposition:Home, Routine, Self-Care

ADT D/C Disposition:Home, Routine, Self-Care         Projected Discharge Date:11/09/2017 12:59 PM

Transportation at D/C:Self                           Discharge Delay Reason:

Follow-Up Date:11/09/2017 12:59 PM                   Discharge Slot:

Final Diagnosis:

----------------------------------------------

Placement Information

----------------------------------------------

----------------------------------------------

Patient Contact Information

----------------------------------------------

Contact Name:ROCCO                         Relationship:Other

Address:                                             Home [phone]

                                                     Work Phone:

City:[place]                                      Alternate Phone:

[facility]/[employer] Code:[place]                                    Email:

----------------------------------------------

Financial Information

----------------------------------------------

Financial Class:MD

[place] Plan Desc[place] [place]        Primary Plan Number:[id number]

Secondary Plan Desc:                                 Secondary Plan Number:

 

 

----------------------------------------------

Assessment Information

----------------------------------------------

----------------------------------------------

[facility] CM Progress Note

----------------------------------------------

CM Note

 

CM Note                       

Notes:

Patient admitted with viral influenza pneumonia complicated by acute COPD exacerbation. 



Initial PT eval suggests that patient will discharge home independently. Hospital medicine note 

suggests that patient will be inpatient 1-2 more days pending clinical improvement. CM available if 


any discharge needs arise.



CM Discharge plan: home independent

 

Date Signed:  11/08/2017 01:30 PM

Electronically Signed By:Ivanna Veloz RN

 

 

----------------------------------------------

Intervention Information

----------------------------------------------

## 2018-07-27 ENCOUNTER — HOSPITAL ENCOUNTER (OUTPATIENT)
Dept: HOSPITAL 80 - FIMAGING | Age: 58
End: 2018-07-27
Attending: INTERNAL MEDICINE
Payer: MEDICAID

## 2018-07-27 DIAGNOSIS — K76.0: ICD-10-CM

## 2018-07-27 DIAGNOSIS — R16.0: ICD-10-CM

## 2018-07-27 DIAGNOSIS — B18.2: Primary | ICD-10-CM
